# Patient Record
Sex: FEMALE | Race: WHITE | Employment: FULL TIME | URBAN - METROPOLITAN AREA
[De-identification: names, ages, dates, MRNs, and addresses within clinical notes are randomized per-mention and may not be internally consistent; named-entity substitution may affect disease eponyms.]

---

## 2017-02-15 DIAGNOSIS — F90.0 ATTENTION DEFICIT HYPERACTIVITY DISORDER (ADHD), PREDOMINANTLY INATTENTIVE TYPE: ICD-10-CM

## 2017-02-15 RX ORDER — LISDEXAMFETAMINE DIMESYLATE 30 MG/1
30 CAPSULE ORAL EVERY MORNING
Qty: 90 CAPSULE | Refills: 0 | Status: SHIPPED | OUTPATIENT
Start: 2017-02-15 | End: 2017-05-22

## 2017-05-22 ENCOUNTER — TELEPHONE (OUTPATIENT)
Dept: FAMILY MEDICINE | Facility: CLINIC | Age: 47
End: 2017-05-22

## 2017-05-22 DIAGNOSIS — F90.0 ATTENTION DEFICIT HYPERACTIVITY DISORDER (ADHD), PREDOMINANTLY INATTENTIVE TYPE: ICD-10-CM

## 2017-05-22 RX ORDER — LISDEXAMFETAMINE DIMESYLATE 30 MG/1
30 CAPSULE ORAL EVERY MORNING
Qty: 90 CAPSULE | Refills: 0 | Status: SHIPPED | OUTPATIENT
Start: 2017-05-22 | End: 2017-09-11

## 2017-05-22 NOTE — TELEPHONE ENCOUNTER
Last filled 2/15/17 for 90 day supply, Danna pt at [facility] [place] [place].  Pt on time for refill.  Has appt for annual set up for June 9th with Danna. Of note, sending along PA info to PA dept as pt knows needs to be re-done.     Katey RomoRN  May 22, 2017 4:10 PM

## 2017-05-30 NOTE — TELEPHONE ENCOUNTER
Called pt and relayed PA approval.  PA team stated pt needs to send script to [facility] if this is whom she wants to fill and deliver med.  Pt should also contact the mail order to confirm all.   Can see below they ran the PA through [facility] her insurance, even though states they notified [facility] [address] of the approval.  PA team feels this should not be a problem, as [facility] is insurance that approved.     Katey Romo RN  May 30, 2017 4:20 PM

## 2017-05-30 NOTE — TELEPHONE ENCOUNTER
Prior Authorization Approval    Authorization Effective Date: 5/30/2017  Authorization Expiration Date: 5/30/2018  Medication: lisdexamfetamine (VYVANSE) 30 MG-APPROVED  Approved Dose/Quantity:    Reference #: [id number]   Insurance Company: oSn ([facility]) - Phone [phone] Fax [phone]  Expected CoPay: UNKNOWN     CoPay Card Available:      Foundation Assistance Needed:    Which Pharmacy is filling the prescription (Not needed for infusion/clinic administered): [id number] [place], [place] - [address]  Pharmacy Notified: Yes  Patient Notified: Yes

## 2017-05-30 NOTE — TELEPHONE ENCOUNTER
[facility] Prior Authorization Team   Phone: [phone]  Fax: [phone]    PA Initiation    Medication: lisdexamfetamine (VYVANSE) 30 MG capsule -   Insurance Company: [employer] ([facility]) - Phone [phone] Fax [phone]  [id number] [place], [place] - [address]  Filling Pharmacy Phone: [phone]  Filling Pharmacy Fax: [phone]  Start Date: 5/23/2017

## 2017-06-09 ENCOUNTER — OFFICE VISIT (OUTPATIENT)
Dept: FAMILY MEDICINE | Facility: CLINIC | Age: 47
End: 2017-06-09

## 2017-06-09 VITALS
RESPIRATION RATE: 15 BRPM | BODY MASS INDEX: 23.48 KG/M2 | DIASTOLIC BLOOD PRESSURE: 75 MMHG | SYSTOLIC BLOOD PRESSURE: 109 MMHG | HEART RATE: 74 BPM | OXYGEN SATURATION: 98 % | WEIGHT: 116.5 LBS | HEIGHT: 59 IN | TEMPERATURE: 97.6 F

## 2017-06-09 DIAGNOSIS — Z13.29 SCREENING FOR THYROID DISORDER: ICD-10-CM

## 2017-06-09 DIAGNOSIS — Z00.00 ANNUAL PHYSICAL EXAM: Primary | ICD-10-CM

## 2017-06-09 DIAGNOSIS — Z13.1 SCREENING FOR DIABETES MELLITUS: ICD-10-CM

## 2017-06-09 DIAGNOSIS — Z13.220 SCREENING FOR CHOLESTEROL LEVEL: ICD-10-CM

## 2017-06-09 LAB
ANION GAP SERPL CALCULATED.3IONS-SCNC: 7 MMOL/L (ref 3–14)
BUN SERPL-MCNC: 11 MG/DL (ref 7–30)
CALCIUM SERPL-MCNC: 9.1 MG/DL (ref 8.5–10.1)
CHLORIDE SERPL-SCNC: 105 MMOL/L (ref 94–109)
CHOLEST SERPL-MCNC: 217 MG/DL
CO2 SERPL-SCNC: 28 MMOL/L (ref 20–32)
CREAT SERPL-MCNC: 0.71 MG/DL (ref 0.52–1.04)
GFR SERPL CREATININE-BSD FRML MDRD: 89 ML/MIN/1.7M2
GLUCOSE SERPL-MCNC: 79 MG/DL (ref 70–99)
HDLC SERPL-MCNC: 88 MG/DL
LDLC SERPL CALC-MCNC: 119 MG/DL
NONHDLC SERPL-MCNC: 129 MG/DL
POTASSIUM SERPL-SCNC: 3.9 MMOL/L (ref 3.4–5.3)
SODIUM SERPL-SCNC: 141 MMOL/L (ref 133–144)
TRIGL SERPL-MCNC: 47 MG/DL
TSH SERPL DL<=0.05 MIU/L-ACNC: 3.1 MU/L (ref 0.4–4)

## 2017-06-09 NOTE — PROGRESS NOTES
Nika is a 47 year old female presents to [facility] for annual Exam:  Concerns:     Neck/back pain and tingling. Seeing a structural harmony practitioner, radha and helping after three sessions.      ADHD: Vyvanse 30 mg daily - concentration/focus and fatigue. 90 Days RX through mail order and letter yearly.   HCM: Mammogram/Tomosynthesis 5/2016; Pap/HPV 7.15.2016; immunizations UTD.   ROS:  General: none  Head/Eyes: neck movement has improved.   Ears/Nose/Throat: none  Cardiovascular: none  Respiratory: none  Gastrointestinal:good  Breast: none  Genitourinary: none  Sexual Function: none  Musculoskeletal:numbness and tingling in finger   Skin: none  Neurological: none  Mental Health: memory loss  Endocrine: none  PAST OB/GYN HISTORY:  G0. Not using contraception [no desire] with regular cycles. [what ever happens/happens]  HCM: PAP/Pelvic august 2016 [has gyn] and mammogram in 5/2017 at [facility].    Past Medical History:   Diagnosis Date     ADHD (attention deficit hyperactivity disorder)     Vyvanse 30 mg     Allergic rhinitis, cause unspecified 8/27/12     Cervical dysplasia 2000     Dysmenorrhea      Raynaud's disease      TMJ (dislocation of temporomandibular joint)      Vitamin D insufficiency 5/2/12   Life Style Modifiers:   Tobacco:  reports that she has never smoked. She does not have any smokeless tobacco history on file.  Alcohol:  reports that  drinks alcohol.   Drug use:  reports that she does not use illicit drugs.  Exercise: Has a  - 6 days a week with a                     Diet: clean and good. No restriction   Supplementation: MTV.   PAST SURGICAL HISTORY:  Past Surgical History   Procedure Laterality Date     Mandible surgery  2006     Tonsillectomy       Adenoidectomy     FAMILY HISTORY:  Mother with breast cancer age 52.    SOCIAL HISTORY:  Purchased condo at [employer] - completed fall 2018.   Moved from to [facility] in 2000.  2014. [place].  with colon cancer - doing well.  " She works at [employer], to - skin products.   MEDICATIONS:  Current Outpatient Prescriptions   Medication     lisdexamfetamine (VYVANSE) 30 MG capsule     naproxen sodium (ANAPROX) 550 MG tablet   ALLERGIES:  Review of patient's allergies indicates no known allergies.  VITALS:  /75  Pulse 74  Temp 97.6  F (36.4  C) (Oral)  Resp 15  Ht 4' 11.45\" (151 cm)  Wt 116 lb 8 oz (52.8 kg)  LMP 06/07/2017 (Exact Date)  SpO2 98%  BMI 23.18 kg/m2  PHYSICAL EXAM:  Constitutional: Well appearing woman in no acute distress.   Psychological: appropriate mood - anxious.  Eyes: anicteric, normal extra-ocular movements   Neck: No thyroidmegaly.   Cardiovascular: regular rate and rhythm, normal S1 and S2, no murmurs, rubs or gallops, peripheral pulses full and symmetric   Respiratory: clear to auscultation, no wheezes or crackles, normal breath sounds.  Breast: N/A .   Musculoskeletal: full range of motion    Skin: Multiple small moles   Neurological: normal gait, no tremor.   GYN: N/A [ has her own GYN]   Diagnoses and associated orders for this visit:  Annual Exam:    -  PAP/Pelvic with GYN office  -   Immunizations up to date  -   Mammogram up to date  -   Estrofactor when perimenopausal  -   Vitamin D 1000 - 2000 mg daily  -   Send letter with lab results.   Vitamin D deficiency  -  Vitamin D on a daily basis  Attention deficit disorder with hyperactivity  - RX - Lisdexamfetamine (VYVANSE) 30 MG capsule; Take 1 capsule (30 mg) by mouth every morning.  Will fill [facility].  And she will call for #90 again in September to send it in the mail. She has documented ADD with good response to Vyvanse for the past years.    Screening for thyroid disorder  -     TSH  Screening for cholesterol level  -     Lipid Profile  Screening for diabetes mellitus  -     Basic metabolic panel          "

## 2017-06-09 NOTE — NURSING NOTE
"47 year old  Chief Complaint   Patient presents with     Physical       Blood pressure 109/75, pulse 74, temperature 97.6  F (36.4  C), temperature source Oral, resp. rate 15, height 4' 11.45\" (151 cm), weight 116 lb 8 oz (52.8 kg), last menstrual period 06/07/2017, SpO2 98 %, not currently breastfeeding. Body mass index is 23.18 kg/(m^2).  Patient Active Problem List   Diagnosis     Breast lump in upper outer quadrant     Attention deficit hyperactivity disorder (ADHD)     Numbness and tingling       Wt Readings from Last 2 Encounters:   06/09/17 116 lb 8 oz (52.8 kg)   08/30/16 113 lb 11.2 oz (51.6 kg)     BP Readings from Last 3 Encounters:   06/09/17 109/75   08/30/16 93/62   05/29/15 119/84         Current Outpatient Prescriptions   Medication     lisdexamfetamine (VYVANSE) 30 MG capsule     naproxen sodium (ANAPROX) 550 MG tablet     No current facility-administered medications for this visit.        Social History   Substance Use Topics     Smoking status: Never Smoker     Smokeless tobacco: Not on file     Alcohol use Yes       Health Maintenance Due   Topic Date Due     LIPID SCREEN Q5 YR FEMALE (SYSTEM ASSIGNED)  02/12/2017     MAMMO Q1 YR  05/09/2017       No results found for: PAP      June 9, 2017 10:52 AM    "

## 2017-06-09 NOTE — MR AVS SNAPSHOT
After Visit Summary   6/9/2017    Nika Foreman    MRN: [id number]           Patient Information     Date Of Birth          1970        Visit Information        Provider Department      6/9/2017 10:40 AM Elham Clay MD [facility]        Today's Diagnoses     Annual physical exam    -  1    Screening for thyroid disorder        Screening for cholesterol level        Screening for diabetes mellitus           Follow-ups after your visit        Who to contact     Please call your clinic at [phone] to:    Ask questions about your health    Make or cancel appointments    Discuss your medicines    Learn about your test results    Speak to your doctor   If you have compliments or concerns about an experience at your clinic, or if you wish to file a complaint, please contact [facility] Patient Relations at [phone] or email us at [email or web address]         Additional Information About Your Visit        MyChart Information     [employer] gives you secure access to your electronic health record. If you see a primary care provider, you can also send messages to your care team and make appointments. If you have questions, please call your primary care clinic.  If you do not have a primary care provider, please call [phone] and they will assist you.      [employer] Software is an electronic gateway that provides easy, online access to your medical records. With Secure Software, you can request a clinic appointment, read your test results, renew a prescription or communicate with your care team.     To access your existing account, please contact your [facility] or call [phone] for assistance.        Care EveryWhere ID     This is your Care EveryWhere ID. This could be used by other organizations to access your [place] medical records  [id number]        Your Vitals Were     Pulse Temperature Respirations Height Last  "Period Pulse Oximetry    74 97.6  F (36.4  C) (Oral) 15 4' 11.45\" (151 cm) 06/07/2017 (Exact Date) 98%    BMI (Body Mass Index)                   23.18 kg/m2            Blood Pressure from Last 3 Encounters:   06/09/17 109/75   08/30/16 93/62   05/29/15 119/84    Weight from Last 3 Encounters:   06/09/17 116 lb 8 oz (52.8 kg)   08/30/16 113 lb 11.2 oz (51.6 kg)   07/22/16 113 lb (51.3 kg)              We Performed the Following     Basic metabolic panel     Lipid Profile     TSH        Primary Care Provider Office Phone # Fax #    Elham Clay [phone]       [facility] [address] [address]        Thank you!     Thank you for choosing [facility]  for your care. Our goal is always to provide you with excellent care. Hearing back from our patients is one way we can continue to improve our services. Please take a few minutes to complete the written survey that you may receive in the mail after your visit with us. Thank you!             Your Updated Medication List - Protect others around you: Learn how to safely use, store and throw away your medicines at www.disposemymeds.org.          This list is accurate as of: 6/9/17 11:36 AM.  Always use your most recent med list.                   Brand Name Dispense Instructions for use    lisdexamfetamine 30 MG capsule    VYVANSE    90 capsule    Take 1 capsule (30 mg) by mouth every morning       naproxen sodium 550 MG tablet    ANAPROX     Take by mouth 2 times daily (with meals)         "

## 2017-07-07 DIAGNOSIS — E55.9 VITAMIN D DEFICIENCY: Primary | ICD-10-CM

## 2017-07-15 ENCOUNTER — HEALTH MAINTENANCE LETTER (OUTPATIENT)
Age: 47
End: 2017-07-15

## 2017-09-11 DIAGNOSIS — F90.0 ATTENTION DEFICIT HYPERACTIVITY DISORDER (ADHD), PREDOMINANTLY INATTENTIVE TYPE: ICD-10-CM

## 2017-09-11 RX ORDER — LISDEXAMFETAMINE DIMESYLATE 30 MG/1
30 CAPSULE ORAL EVERY MORNING
Qty: 90 CAPSULE | Refills: 0 | Status: SHIPPED | OUTPATIENT
Start: 2017-09-11 | End: 2017-12-12

## 2017-12-12 DIAGNOSIS — F90.0 ATTENTION DEFICIT HYPERACTIVITY DISORDER (ADHD), PREDOMINANTLY INATTENTIVE TYPE: ICD-10-CM

## 2017-12-12 RX ORDER — LISDEXAMFETAMINE DIMESYLATE 30 MG/1
30 CAPSULE ORAL EVERY MORNING
Qty: 90 CAPSULE | Refills: 0 | Status: SHIPPED | OUTPATIENT
Start: 2017-12-12 | End: 2017-12-15

## 2017-12-12 NOTE — TELEPHONE ENCOUNTER
Pt called to get refill of Vyvanse. Pt would like a call back to discuss if it can be mailed or if she needs to come to the clinic to pick it up. Please call back pt, she runs out of medication on Friday. Thanks              Last office visit: 06/09/2017, No future appointment

## 2017-12-15 ENCOUNTER — TELEPHONE (OUTPATIENT)
Dept: FAMILY MEDICINE | Facility: CLINIC | Age: 47
End: 2017-12-15

## 2017-12-15 DIAGNOSIS — F90.0 ATTENTION DEFICIT HYPERACTIVITY DISORDER (ADHD), PREDOMINANTLY INATTENTIVE TYPE: ICD-10-CM

## 2017-12-15 RX ORDER — LISDEXAMFETAMINE DIMESYLATE 30 MG/1
30 CAPSULE ORAL EVERY MORNING
Qty: 90 CAPSULE | Refills: 0 | Status: SHIPPED | OUTPATIENT
Start: 2017-12-15 | End: 2018-03-23

## 2017-12-15 NOTE — TELEPHONE ENCOUNTER
----- Message from Dante Stewart sent at 12/15/2017 12:12 PM CST -----  Regarding: REFILL NEEDED TODAY - Dr. Clay Pt  Contact: [phone]  Pt stated that she would like a refill of her VYVANSE.  Pt stated she was expecting it ready for  today.  Please call this Pt back at the number above.     Thank you,  CV  Call Center

## 2017-12-15 NOTE — TELEPHONE ENCOUNTER
Prescription for the Vyvanse is ready at the  to .  I have called and left her a message.   Approved per Dr. Clay.

## 2018-03-20 ENCOUNTER — TELEPHONE (OUTPATIENT)
Dept: FAMILY MEDICINE | Facility: CLINIC | Age: 48
End: 2018-03-20

## 2018-03-21 NOTE — TELEPHONE ENCOUNTER
PA Initiation    Medication: lisdexamfetamine (VYVANSE) 30 MG capsule-Initiated  Insurance Company: [employer] [place] - Phone [phone] Fax [phone]  [id number] [place], [place] - [address]  Filling Pharmacy Phone: [phone]  Filling Pharmacy Fax:    Start Date: 3/21/2018    Received forms to fill out and fax back to insurance.  Filled out clinical information on forms and manually faxed back to [facility] at [phone]

## 2018-03-21 NOTE — TELEPHONE ENCOUNTER
Prior Authorization Approval    Authorization Effective Date: 3/21/2018  Authorization Expiration Date: 12/31/1999  Medication: lisdexamfetamine (VYVANSE) 30 MG capsule-APPROVED  Approved Dose/Quantity:   Reference #:     Insurance Company: [employer] [place] - Phone [phone] Fax [phone]  Expected CoPay: $     CoPay Card Available:      Foundation Assistance Needed:    Which Pharmacy is filling the prescription (Not needed for infusion/clinic administered):   Pharmacy Notified: No  Patient Notified: Yes

## 2018-03-23 DIAGNOSIS — F90.0 ATTENTION DEFICIT HYPERACTIVITY DISORDER (ADHD), PREDOMINANTLY INATTENTIVE TYPE: ICD-10-CM

## 2018-03-23 RX ORDER — LISDEXAMFETAMINE DIMESYLATE 30 MG/1
30 CAPSULE ORAL EVERY MORNING
Qty: 90 CAPSULE | Refills: 0 | Status: SHIPPED | OUTPATIENT
Start: 2018-03-23 | End: 2018-04-13

## 2018-04-12 NOTE — PROGRESS NOTES
Nika is a 48 year old female presents to [facility] for annual Exam:    Concerns:   Perimenopausal symptoms: menses are painful every three-four weeks - cramps have worsened in the last couple of months. Lots of craving sweet craving the weeks before menses, coldness of finger tips and toes, fatigue and weight gain. Wondering about testing for menopause. No hot flashes or night sweats.     Sees radha for her back pain     Goes to GYN for pap/Pelvic and mammogram in May 2018  ADHD: Vyvanse 30 mg daily - concentration/focus and fatigue well managed.   HCM: Mammogram/Tomosynthesis 5/2016; Pap/HPV 7.15.2016; immunizations UTD.   ROS:  General: none  Head/Eyes: neck movement has improved.   Ears/Nose/Throat: none  Cardiovascular: none  Respiratory: none  Gastrointestinal:good  Breast: none  Genitourinary: none  Sexual Function: none  Musculoskeletal:numbness and tingling in finger   Skin: none  Neurological: none  Mental Health: memory loss  Endocrine: none  PAST OB/GYN HISTORY:  G0. Not using contraception [no desire] with regular cycles. [what ever happens/happens]  HCM: PAP/Pelvic august 2016 [has gyn] and mammogram in 5/2017 at [facility].    Past Medical History:   Diagnosis Date     ADHD (attention deficit hyperactivity disorder)     Vyvanse 30 mg     Allergic rhinitis, cause unspecified 8/27/12     Cervical dysplasia 2000     Dysmenorrhea      Raynaud's disease      TMJ (dislocation of temporomandibular joint)      Vitamin D insufficiency 5/2/12   Life Style Modifiers:   Tobacco:  reports that she has never smoked. She does not have any smokeless tobacco history on file.  Alcohol:  reports that  drinks alcohol.   Drug use:  reports that she does not use illicit drugs.  Exercise: Has a  - 6 days a week with a                     Diet: clean and good. No restriction   Supplementation: MTV.   PAST SURGICAL HISTORY:  Past Surgical History   Procedure Laterality Date     Mandible surgery  2006     Tonsillectomy        Adenoidectomy     FAMILY HISTORY:  Mother with breast cancer age 52.    SOCIAL HISTORY:  Purchased condo at [employer] - completed fall 2018.   Moved from to [place] in 2000.  2014. [place].  with colon cancer - doing well.  She works at [employer], to - skin products.   MEDICATIONS:  Current Outpatient Prescriptions   Medication     lisdexamfetamine (VYVANSE) 30 MG capsule     naproxen sodium (ANAPROX) 550 MG tablet   ALLERGIES:  Review of patient's allergies indicates no known allergies.  VITALS:  There were no vitals taken for this visit.  PHYSICAL EXAM:  Constitutional: Well appearing woman in no acute distress.   Psychological: appropriate mood - anxious.  Eyes: anicteric, normal extra-ocular movements   Neck: No thyroidmegaly.   Cardiovascular: regular rate and rhythm, normal S1 and S2, no murmurs, rubs or gallops, peripheral pulses full and symmetric   Respiratory: clear to auscultation, no wheezes or crackles, normal breath sounds.  Breast: N/A .   Musculoskeletal: full range of motion    Skin: Multiple small moles   Neurological: normal gait, no tremor.   GYN: N/A [ has her own GYN]   Diagnoses and associated orders for this visit:  Annual Exam:    -  PAP/Pelvic with GYN office  -   Immunizations up to date  -   Mammogram up to date  -   Vitamin D 1000 - 2000 mg daily  Vitamin D deficiency  -  Vitamin D 2000 iu daily  -  Vitamin D level.  Attention deficit disorder with hyperactivity  - RX - Lisdexamfetamine (VYVANSE) 30 MG capsule; Take 1 capsule (30 mg) by mouth every morning.  Will fill from [facility] # 90.  She has documented ADD with good response to Vyvanse for the past years.    Perimenopausal:  -  Estrofactor discussed and she may consider for perimenopausal symptoms   - exercising and eating well   - Acupuncture - Reiki/clari [Blas Kahn]   - Declined OCP's [not tolerated] or IUD mirena, Ibuprofen/Naprosyn 24 hours before onset of menses [she will consider]    - Pelvic US could be  discussed when she has her GYN visit

## 2018-04-13 ENCOUNTER — OFFICE VISIT (OUTPATIENT)
Dept: FAMILY MEDICINE | Facility: CLINIC | Age: 48
End: 2018-04-13
Payer: COMMERCIAL

## 2018-04-13 VITALS
SYSTOLIC BLOOD PRESSURE: 109 MMHG | TEMPERATURE: 98.3 F | OXYGEN SATURATION: 99 % | WEIGHT: 120.75 LBS | DIASTOLIC BLOOD PRESSURE: 73 MMHG | BODY MASS INDEX: 23.71 KG/M2 | HEIGHT: 60 IN | HEART RATE: 73 BPM

## 2018-04-13 DIAGNOSIS — F90.0 ATTENTION DEFICIT HYPERACTIVITY DISORDER (ADHD), PREDOMINANTLY INATTENTIVE TYPE: ICD-10-CM

## 2018-04-13 DIAGNOSIS — N95.1 PERIMENOPAUSAL: ICD-10-CM

## 2018-04-13 DIAGNOSIS — E55.9 VITAMIN D DEFICIENCY: ICD-10-CM

## 2018-04-13 DIAGNOSIS — N94.6 DYSMENORRHEA: ICD-10-CM

## 2018-04-13 DIAGNOSIS — Z00.00 ANNUAL PHYSICAL EXAM: Primary | ICD-10-CM

## 2018-04-13 LAB
T3FREE SERPL-MCNC: 2.3 PG/ML (ref 2.3–4.2)
T4 FREE SERPL-MCNC: 0.92 NG/DL (ref 0.76–1.46)
TSH SERPL DL<=0.005 MIU/L-ACNC: 2.74 MU/L (ref 0.4–4)

## 2018-04-13 RX ORDER — LISDEXAMFETAMINE DIMESYLATE 30 MG/1
30 CAPSULE ORAL EVERY MORNING
Qty: 90 CAPSULE | Refills: 0 | Status: SHIPPED | OUTPATIENT
Start: 2018-04-13 | End: 2018-06-29

## 2018-04-13 RX ORDER — NAPROXEN SODIUM 550 MG/1
550 TABLET ORAL 2 TIMES DAILY WITH MEALS
Qty: 60 TABLET | Refills: 1 | Status: SHIPPED | OUTPATIENT
Start: 2018-04-13 | End: 2019-05-08

## 2018-04-13 ASSESSMENT — PAIN SCALES - GENERAL: PAINLEVEL: NO PAIN (0)

## 2018-04-13 NOTE — NURSING NOTE
"48 year old  Chief Complaint   Patient presents with     Physical     Wants to discuss menopause symptoms.       Blood pressure 109/73, pulse 73, temperature 98.3  F (36.8  C), temperature source Temporal, height 4' 11.61\" (151.4 cm), weight 120 lb 12 oz (54.8 kg), last menstrual period 03/23/2018, SpO2 99 %, not currently breastfeeding. Body mass index is 23.9 kg/(m^2).  Patient Active Problem List   Diagnosis     Breast lump in upper outer quadrant     Attention deficit hyperactivity disorder (ADHD)     Numbness and tingling       Wt Readings from Last 2 Encounters:   04/13/18 120 lb 12 oz (54.8 kg)   06/09/17 116 lb 8 oz (52.8 kg)     BP Readings from Last 3 Encounters:   04/13/18 109/73   06/09/17 109/75   08/30/16 93/62         Current Outpatient Prescriptions   Medication     lisdexamfetamine (VYVANSE) 30 MG capsule     naproxen sodium (ANAPROX) 550 MG tablet     No current facility-administered medications for this visit.        Social History   Substance Use Topics     Smoking status: Never Smoker     Smokeless tobacco: Never Used     Alcohol use Yes       Health Maintenance Due   Topic Date Due     MAMMO Q1 YR  05/09/2017       No results found for: ILIR Romeo MA  April 13, 2018 10:56 AM    "

## 2018-04-13 NOTE — PATIENT INSTRUCTIONS
Perimenopausal and dysmenorrhea    Rolfing vs acupuncture    Naprosyn with cramps    Pelvic US - consider    Vitamin D 5 IU when remembering    Estrofactors:  Order products from [employer]  Call: [phone]  Code: Danna [id number]  On Line: www.[email or web address]  Estrofactors

## 2018-04-13 NOTE — MR AVS SNAPSHOT
After Visit Summary   4/13/2018    Nika Foreman    MRN: [id number]           Patient Information     Date Of Birth          1970        Visit Information        Provider Department      4/13/2018 10:40 AM Elham Clay MD [facility]        Today's Diagnoses     Annual physical exam    -  1    Dysmenorrhea        Perimenopausal        Attention deficit hyperactivity disorder (ADHD), predominantly inattentive type        Vitamin D deficiency          Care Instructions    Perimenopausal and dysmenorrhea    Rolfing vs acupuncture    Naprosyn with cramps    Pelvic US - consider    Vitamin D 5 IU when remembering    Estrofactors:  Order products from [employer]  Call: [phone]  Code: Danna [id number]  On Line: www.[email or web address]  Estrofactors          Follow-ups after your visit        Who to contact     Please call your clinic at [phone] to:    Ask questions about your health    Make or cancel appointments    Discuss your medicines    Learn about your test results    Speak to your doctor            Additional Information About Your Visit        SensGardharShoppable Information     [employer] gives you secure access to your electronic health record. If you see a primary care provider, you can also send messages to your care team and make appointments. If you have questions, please call your primary care clinic.  If you do not have a primary care provider, please call [phone] and they will assist you.      [employer] is an electronic gateway that provides easy, online access to your medical records. With [employer], you can request a clinic appointment, read your test results, renew a prescription or communicate with your care team.     To access your existing account, please contact your [facility] or call [phone] for assistance.        Care EveryWhere ID     This is your Care EveryWhere ID. This could be used by other organizations to access your [place]  "medical records  [id number]        Your Vitals Were     Pulse Temperature Height Last Period Pulse Oximetry BMI (Body Mass Index)    73 98.3  F (36.8  C) (Temporal) 4' 11.61\" (151.4 cm) 03/23/2018 (Exact Date) 99% 23.9 kg/m2       Blood Pressure from Last 3 Encounters:   04/13/18 109/73   06/09/17 109/75   08/30/16 93/62    Weight from Last 3 Encounters:   04/13/18 120 lb 12 oz (54.8 kg)   06/09/17 116 lb 8 oz (52.8 kg)   08/30/16 113 lb 11.2 oz (51.6 kg)              We Performed the Following     T3 Free     T4 free     TSH     Vitamin D Deficiency          Today's Medication Changes          These changes are accurate as of 4/13/18 11:26 AM.  If you have any questions, ask your nurse or doctor.               These medicines have changed or have updated prescriptions.        Dose/Directions    naproxen sodium 550 MG tablet   Commonly known as:  ANAPROX   This may have changed:  how much to take   Used for:  Dysmenorrhea   Changed by:  Elham Clay MD        Dose:  550 mg   Take 1 tablet (550 mg) by mouth 2 times daily (with meals)   Quantity:  60 tablet   Refills:  1            Where to get your medicines      These medications were sent to [facility] [id number] [facility] - [place], [place] - [address]  [address], [place] [address]     Phone:  [phone]     naproxen sodium 550 MG tablet         Some of these will need a paper prescription and others can be bought over the counter.  Ask your nurse if you have questions.     Bring a paper prescription for each of these medications     lisdexamfetamine 30 MG capsule                Primary Care Provider Office Phone # Fax #    Elham Clay [phone]       [address] [facility] [address]  [facility] [address]        Equal Access to Services     ROGER ESQUIVEL AH: Tani Mckeon, waaxda luqadaha, qaybta kaalmada renée, jay garcia. [facility] [phone].    ATENCIÓN: Si sara clarke a good " disposición servicios gratuitos de asistencia lingüística. [place] [phone].    We comply with applicable federal civil rights laws and [place] laws. We do not discriminate on the basis of race, color, national origin, age, disability, sex, sexual orientation, or gender identity.            Thank you!     [facility] you for choosing [facility]. Our goal is always to provide you with excellent care. Hearing back from our patients is one way we can continue to improve our services. Please take a few minutes to complete the written survey that you may receive in the mail after your visit with us. Thank you!             Your Updated Medication List - Protect others around you: Learn how to safely use, store and throw away your medicines at www.disposemymeds.org.          This list is accurate as of 4/13/18 11:26 AM.  Always use your most recent med list.                   Brand Name Dispense Instructions for use Diagnosis    lisdexamfetamine 30 MG capsule    VYVANSE    90 capsule    Take 1 capsule (30 mg) by mouth every morning    Attention deficit hyperactivity disorder (ADHD), predominantly inattentive type       naproxen sodium 550 MG tablet    ANAPROX    60 tablet    Take 1 tablet (550 mg) by mouth 2 times daily (with meals)    Dysmenorrhea

## 2018-04-16 LAB — DEPRECATED CALCIDIOL+CALCIFEROL SERPL-MC: 25 UG/L (ref 20–75)

## 2018-06-29 ENCOUNTER — TELEPHONE (OUTPATIENT)
Dept: FAMILY MEDICINE | Facility: CLINIC | Age: 48
End: 2018-06-29

## 2018-06-29 DIAGNOSIS — F90.0 ATTENTION DEFICIT HYPERACTIVITY DISORDER (ADHD), PREDOMINANTLY INATTENTIVE TYPE: ICD-10-CM

## 2018-06-29 RX ORDER — LISDEXAMFETAMINE DIMESYLATE 30 MG/1
30 CAPSULE ORAL EVERY MORNING
Qty: 90 CAPSULE | Refills: 0 | Status: SHIPPED | OUTPATIENT
Start: 2018-06-29 | End: 2018-10-19

## 2018-06-29 RX ORDER — LISDEXAMFETAMINE DIMESYLATE 30 MG/1
30 CAPSULE ORAL EVERY MORNING
Qty: 90 CAPSULE | Refills: 0 | Status: SHIPPED | OUTPATIENT
Start: 2018-06-29 | End: 2018-06-29

## 2018-06-29 NOTE — TELEPHONE ENCOUNTER
----- Message from Svetlana Durand sent at 6/29/2018  1:40 PM CDT -----  Regarding: RX Refill - Dr Clay  Contact: [phone]  The pt is calling to request a 90 day supply for lisdexamfetamine (VYVANSE) 30 MG capsule. She is hoping to be able to  the script today as they going [place].   Please call the pt at [phone] when it is ready to .  Thanks - Svetlana    Please DO NOT send this message and/or reply back to sender.  Call Center Representatives DO NOT respond to messages.        Called patient - Dr Clay will have signed script ready by 4pm.  Patient will come in to collect.    Luann Morgan RN

## 2018-07-22 ENCOUNTER — HEALTH MAINTENANCE LETTER (OUTPATIENT)
Age: 48
End: 2018-07-22

## 2018-10-19 DIAGNOSIS — F90.0 ATTENTION DEFICIT HYPERACTIVITY DISORDER (ADHD), PREDOMINANTLY INATTENTIVE TYPE: ICD-10-CM

## 2018-10-19 RX ORDER — LISDEXAMFETAMINE DIMESYLATE 30 MG/1
30 CAPSULE ORAL EVERY MORNING
Qty: 90 CAPSULE | Refills: 0 | Status: SHIPPED | OUTPATIENT
Start: 2018-10-19 | End: 2019-01-18

## 2019-01-17 DIAGNOSIS — F90.0 ATTENTION DEFICIT HYPERACTIVITY DISORDER (ADHD), PREDOMINANTLY INATTENTIVE TYPE: ICD-10-CM

## 2019-01-17 NOTE — TELEPHONE ENCOUNTER
Last Office Visit: 4/13/18  Future [facility] Appointments: none  Medication last refilled: 10/19/18 #90 with 0 RF    Routing refill request to provider for review/approval because: Drug not on the [facility] refill protocol - to provider to daisy Reynoso RN  01/17/19  2:30 PM

## 2019-01-18 RX ORDER — LISDEXAMFETAMINE DIMESYLATE 30 MG/1
30 CAPSULE ORAL EVERY MORNING
Qty: 90 CAPSULE | Refills: 0 | Status: SHIPPED | OUTPATIENT
Start: 2019-01-18 | End: 2019-05-02

## 2019-05-01 DIAGNOSIS — F90.0 ATTENTION DEFICIT HYPERACTIVITY DISORDER (ADHD), PREDOMINANTLY INATTENTIVE TYPE: ICD-10-CM

## 2019-05-01 NOTE — TELEPHONE ENCOUNTER
[facility] Call Center    Phone Message    May a detailed message be left on voicemail: yes    Reason for Call: Medication Refill Request    Has the patient contacted the pharmacy for the refill? Yes   Name of medication being requested: lisdexamfetamine (VYVANSE) 30 MG capsule  Provider who prescribed the medication: Danna  Pharmacy: patient will  at   Date medication is needed: patient would like a call when it is available for , she did state that she will be out by Monday and would like to  on Friday if she can.         Action Taken: Message routed to:  [facility]:

## 2019-05-01 NOTE — TELEPHONE ENCOUNTER
Last Office Visit: 4/13/18  Future [facility] Appointments: 5/8/19  Medication last refilled: 1/17/19 #90 with 0 RF    Routing refill request to provider for review/approval because: Drug not on the [facility] refill protocol   Dina Reynoso RN  05/01/19  3:35 PM

## 2019-05-02 ENCOUNTER — TELEPHONE (OUTPATIENT)
Dept: OBGYN | Facility: CLINIC | Age: 49
End: 2019-05-02

## 2019-05-02 RX ORDER — LISDEXAMFETAMINE DIMESYLATE 30 MG/1
30 CAPSULE ORAL EVERY MORNING
Qty: 90 CAPSULE | Refills: 0 | Status: SHIPPED | OUTPATIENT
Start: 2019-05-02 | End: 2019-05-08

## 2019-05-02 NOTE — TELEPHONE ENCOUNTER
ARACELIS Arroyo called from [facility] requesting Vyvance order be signed in our clinic so patient can  today.    Retrieved hard copy signed by Dr. Clay and left at  for patient to  today

## 2019-05-02 NOTE — TELEPHONE ENCOUNTER
Called and informed the patient via voicemail that the Rx is signed and ready to  at [facility] - provided the address and let her know that it is about a mile if she were to walk.   Dina Reynoso RN  05/02/19  10:04 AM

## 2019-05-06 NOTE — PROGRESS NOTES
Nika is a 49 year old female presents to [facility] for annual Exam:    Concerns:   Wondering about colonoscopy and getting it earlier: unknown knowledge of family history of colon cancer.   with colon cancer    Perimenopausal symptoms: menses are painful every two weeks with cramping. Seeing Jacques Szymanski DO and cramping better this past month. [taking chaste berry and magnesium]  Goes to GYN for pap/Pelvic and mammogram [had hysterosonogram - 11/2018] and is scheduled for visit May 2019  ADHD: Vyvanse 30 mg daily - concentration/focus and fatigue well managed on this regimen.     HCM: Mammogram/Tomosynthesis 5/2018; Pap/HPV 7.15.2016; immunizations UTD;  ROS:  General: cold hands   Ears/Nose/Throat: none  Cardiovascular: none  Respiratory: none  Gastrointestinal:good  Breast: none  Genitourinary: none  Sexual Function: none  Musculoskeletal:numbness and tingling in finger   Skin: NAILS ARE BAD - WILL TAKE COLLAGEN  Neurological: none  Mental Health: memory loss  Endocrine: none  PAST OB/GYN HISTORY:  G0. Not using contraception [no desire] with regular cycles. [what ever happens/happens]    Past Medical History:   Diagnosis Date     ADHD (attention deficit hyperactivity disorder)     Vyvanse 30 mg     Allergic rhinitis, cause unspecified 8/27/12     Cervical dysplasia 2000     Dysmenorrhea      Raynaud's disease      TMJ (dislocation of temporomandibular joint)      Vitamin D insufficiency 5/2/12   Life Style Modifiers:   Tobacco:  reports that she has never smoked. She does not have any smokeless tobacco history on file.  Alcohol:  reports that  drinks alcohol.   Drug use:  reports that she does not use illicit drugs.  Exercise: Has a  - 6 days a week with a                     Diet: clean and good. No restriction   Supplementation: MTV.   PAST SURGICAL HISTORY:  Past Surgical History   Procedure Laterality Date     Mandible surgery  2006     Tonsillectomy       Adenoidectomy     FAMILY  "HISTORY:  Mother with breast cancer age 52.    SOCIAL HISTORY:  Purchased condo at [employer] - completed fall 2018.   Moved from to [facility] in 2000.  2014. [place].  with colon cancer - doing well.  She works at [employer], to - skin products.   MEDICATIONS:  Current Outpatient Prescriptions   Medication     lisdexamfetamine (VYVANSE) 30 MG capsule     naproxen sodium (ANAPROX) 550 MG tablet   ALLERGIES:  Review of patient's allergies indicates no known allergies.  VITALS:  /79 (BP Location: Right arm, Patient Position: Sitting, Cuff Size: Adult Regular)   Pulse 72   Temp 98  F (36.7  C) (Oral)   Resp 16   Ht 1.511 m (4' 11.5\")   Wt 53.8 kg (118 lb 8 oz)   LMP 04/28/2019 (Exact Date)   SpO2 100%   BMI 23.53 kg/m    PHYSICAL EXAM:  Constitutional: Well appearing woman in no acute distress.   Psychological: appropriate mood - anxious.  Eyes: anicteric, normal extra-ocular movements   Neck: No thyroidmegaly.   Cardiovascular: regular rate and rhythm, normal S1 and S2, no murmurs, rubs or gallops, peripheral pulses full and symmetric   Respiratory: clear to auscultation, no wheezes or crackles, normal breath sounds.  Breast: symmetrical without abnormal masses. No nipple changes.   Musculoskeletal: full range of motion    Skin: Multiple small moles   Neurological: normal gait, no tremor.   GYN: N/A [ has her own GYN]   Diagnoses and associated orders for this visit:  Annual Exam:   -   Immunizations up to date  -   Mammogram in May 2019   -   Vitamin D 1000 - 2000 mg daily  Attention deficit disorder with hyperactivity  - RX - Lisdexamfetamine (VYVANSE) 30 MG capsule; Take 1 capsule (30 mg) by mouth every morning.  Will fill from [facility].  She has documented ADD with good response to Vyvanse for the past years.  [posted 8.1.2019]  Perimenopausal:  -  Will continue to see Jacques Szymanski DO  -   GYN exam with bernabe yearly    - exercising and eating well                - [place]                - " Discussed a holistic approach  Colon cancer screening  -     GASTROENTEROLOGY ADULT REF PROCEDURE ONLY; Future    Dysmenorrhea  -     naproxen sodium (ANAPROX) 550 MG tablet; Take 1 tablet (550 mg) by mouth 2 times daily (with meals)

## 2019-05-08 ENCOUNTER — OFFICE VISIT (OUTPATIENT)
Dept: FAMILY MEDICINE | Facility: CLINIC | Age: 49
End: 2019-05-08
Payer: COMMERCIAL

## 2019-05-08 VITALS
BODY MASS INDEX: 23.26 KG/M2 | RESPIRATION RATE: 16 BRPM | HEIGHT: 60 IN | OXYGEN SATURATION: 100 % | WEIGHT: 118.5 LBS | DIASTOLIC BLOOD PRESSURE: 79 MMHG | TEMPERATURE: 98 F | SYSTOLIC BLOOD PRESSURE: 114 MMHG | HEART RATE: 72 BPM

## 2019-05-08 DIAGNOSIS — F90.0 ATTENTION DEFICIT HYPERACTIVITY DISORDER (ADHD), PREDOMINANTLY INATTENTIVE TYPE: ICD-10-CM

## 2019-05-08 DIAGNOSIS — Z12.11 COLON CANCER SCREENING: ICD-10-CM

## 2019-05-08 DIAGNOSIS — N94.6 DYSMENORRHEA: ICD-10-CM

## 2019-05-08 DIAGNOSIS — Z00.00 ANNUAL PHYSICAL EXAM: Primary | ICD-10-CM

## 2019-05-08 DIAGNOSIS — N95.1 PERIMENOPAUSAL: ICD-10-CM

## 2019-05-08 RX ORDER — LISDEXAMFETAMINE DIMESYLATE 30 MG/1
30 CAPSULE ORAL EVERY MORNING
Qty: 90 CAPSULE | Refills: 0 | Status: SHIPPED | OUTPATIENT
Start: 2019-08-01 | End: 2019-10-22

## 2019-05-08 RX ORDER — NAPROXEN SODIUM 550 MG/1
550 TABLET ORAL 2 TIMES DAILY WITH MEALS
Qty: 60 TABLET | Refills: 1 | Status: SHIPPED | OUTPATIENT
Start: 2019-05-08 | End: 2020-02-07

## 2019-05-08 ASSESSMENT — MIFFLIN-ST. JEOR: SCORE: 1086.07

## 2019-05-08 NOTE — PATIENT INSTRUCTIONS
[facility] [phone]       - neha     [facility] endoscopy [phone]    [facility]: [phone] endoscopy

## 2019-05-08 NOTE — NURSING NOTE
"49 year old  Chief Complaint   Patient presents with     Physical       Blood pressure 114/79, pulse 72, temperature 98  F (36.7  C), temperature source Oral, resp. rate 16, height 1.511 m (4' 11.5\"), weight 53.8 kg (118 lb 8 oz), last menstrual period 04/28/2019, SpO2 100 %, not currently breastfeeding. Body mass index is 23.53 kg/m .  Patient Active Problem List   Diagnosis     Breast lump in upper outer quadrant     Attention deficit hyperactivity disorder (ADHD)     Numbness and tingling       Wt Readings from Last 2 Encounters:   05/08/19 53.8 kg (118 lb 8 oz)   04/13/18 54.8 kg (120 lb 12 oz)     BP Readings from Last 3 Encounters:   05/08/19 114/79   04/13/18 109/73   06/09/17 109/75         Current Outpatient Medications   Medication     lisdexamfetamine (VYVANSE) 30 MG capsule     naproxen sodium (ANAPROX) 550 MG tablet     No current facility-administered medications for this visit.        Social History     Tobacco Use     Smoking status: Never Smoker     Smokeless tobacco: Never Used   Substance Use Topics     Alcohol use: Yes     Drug use: No       Health Maintenance Due   Topic Date Due     HIV SCREEN (SYSTEM ASSIGNED)  02/12/1990     MAMMO Q1 YR  05/09/2017     PHQ-2  01/01/2019     PREVENTIVE CARE VISIT  04/13/2019       No results found for: PAP      May 8, 2019 9:24 AM    "

## 2019-06-04 ENCOUNTER — TELEPHONE (OUTPATIENT)
Dept: GASTROENTEROLOGY | Facility: CLINIC | Age: 49
End: 2019-06-04

## 2019-06-04 NOTE — TELEPHONE ENCOUNTER
Referring Provider: Dr. Elham Clay    BMI-23.53    Are you on daily home oxygen? n    Have you had a heart or lung transplant? n      In the past year, have you had any heart related issues  Including cardiomyopathy or a MI within 6 months, that required cardiac stenting or other implantable devices? n    What type of implantable device do you have? n    Do you take nitroglycerin? If yes, how often? n    Are you currently taking any blood thinners?n      (Females) Are you currently pregnant? n  If yes, how many weeks?      Have you had a procedure in the past that was difficult to tolerate with conscious sedation? Any allergies to Fentanyl or Versed n        Do you currently use any of the following?    Are you taking any scheduled prescription narcotics more than once daily? n    Drink alcohol daily? n    Currently using any street drugs or methadone?n    Do you have any history of post-traumatic stress syndrome or mental health issues? n

## 2019-07-15 ENCOUNTER — TELEPHONE (OUTPATIENT)
Dept: GASTROENTEROLOGY | Facility: OUTPATIENT CENTER | Age: 49
End: 2019-07-15

## 2019-07-17 ENCOUNTER — TELEPHONE (OUTPATIENT)
Dept: GASTROENTEROLOGY | Facility: OUTPATIENT CENTER | Age: 49
End: 2019-07-17

## 2019-07-17 NOTE — TELEPHONE ENCOUNTER
Patient taking any blood thinners ? No     Heart disease ? Denies     Lung disease ?Denies       Sleep apnea ?Denies     Diabetic ?Denies     Kidney disease ?Denies     Electronic implanted medical devices ?Denies     Are you taking any narcotic pain medication ? Denies       PTSD ? N/a     Prep instructions reviewed with patient ? Patient declined review.  policy, MAC sedation plan reviewed. Advised patient to have someone stay with her post exam    Pharmacy : N/a    Indication for procedure : Screening colonoscopy    Referring provider :Elham Clay MD     Arrival Time : 6:30 AM

## 2019-07-22 ENCOUNTER — TRANSFERRED RECORDS (OUTPATIENT)
Dept: HEALTH INFORMATION MANAGEMENT | Facility: CLINIC | Age: 49
End: 2019-07-22

## 2019-07-22 ENCOUNTER — DOCUMENTATION ONLY (OUTPATIENT)
Dept: GASTROENTEROLOGY | Facility: OUTPATIENT CENTER | Age: 49
End: 2019-07-22
Payer: COMMERCIAL

## 2019-08-29 LAB — PAP: NORMAL

## 2019-10-22 DIAGNOSIS — F90.0 ATTENTION DEFICIT HYPERACTIVITY DISORDER (ADHD), PREDOMINANTLY INATTENTIVE TYPE: ICD-10-CM

## 2019-10-22 RX ORDER — LISDEXAMFETAMINE DIMESYLATE 30 MG/1
30 CAPSULE ORAL EVERY MORNING
Qty: 90 CAPSULE | Refills: 0 | Status: SHIPPED | OUTPATIENT
Start: 2019-11-08 | End: 2019-11-06

## 2019-10-22 NOTE — TELEPHONE ENCOUNTER
Last Office Visit: 5/8/19  Future [facility] Appointments: none  Medication last refilled: 8/10/19,  reviewed    DUE: 11/8/19    Routing refill request to provider for review/approval because: Drug not on the [facility] refill protocol   Dina Reynoso RN  10/22/19  4:31 PM

## 2019-11-03 ENCOUNTER — HEALTH MAINTENANCE LETTER (OUTPATIENT)
Age: 49
End: 2019-11-03

## 2019-11-06 ENCOUNTER — TELEPHONE (OUTPATIENT)
Dept: FAMILY MEDICINE | Facility: CLINIC | Age: 49
End: 2019-11-06

## 2019-11-06 DIAGNOSIS — F90.0 ATTENTION DEFICIT HYPERACTIVITY DISORDER (ADHD), PREDOMINANTLY INATTENTIVE TYPE: ICD-10-CM

## 2019-11-06 RX ORDER — LISDEXAMFETAMINE DIMESYLATE 30 MG/1
30 CAPSULE ORAL EVERY MORNING
Qty: 90 CAPSULE | Refills: 0 | Status: SHIPPED | OUTPATIENT
Start: 2019-11-08 | End: 2020-02-07

## 2019-11-06 NOTE — TELEPHONE ENCOUNTER
LM for pt that script for vyvanse was sent electronically to [facility] [address].   She can call back with any other questions.  Su Andrade RN  [facility]

## 2019-11-06 NOTE — TELEPHONE ENCOUNTER
Re-do of vyvanse prescription - pt wants written prescription paper for this - she chooses to get this to a mail order pharmacy. Will call [facility] where previous prescription was sent, and have them cancel that script.  Su Andrade RN  [facility]

## 2019-11-06 NOTE — TELEPHONE ENCOUNTER
[facility] Call Center    Phone Message    May a detailed message be left on voicemail: yes    Reason for Call: Other: Patient called she [facility]. Patient said she need the actual physical copy of VYVANSE and want to pick it up on Friday. Patient is out of [place] and want to get this resolved before she come back.     Action Taken: Other: [facility]

## 2020-02-06 NOTE — PROGRESS NOTES
"  SUBJECTIVE:   Nika Foreman is a 49 year old female who presents to clinic today for an annual wellness exam.    Previously followed by Dr. Clay who has retired.     # ADHD  - initially prescribed Vyvanse in  by psychiatry  - prescribing taken over by Dr. Clay  - no difficulty falling asleep  - appetite stable  - works well for her attention  - does cause dry mouth sometimes  - tries to take drug holidays when doesn't need to be as focused    Wt Readings from Last 6 Encounters:   20 53.6 kg (118 lb 4 oz)   19 53.8 kg (118 lb 8 oz)   18 54.8 kg (120 lb 12 oz)   17 52.8 kg (116 lb 8 oz)   16 51.6 kg (113 lb 11.2 oz)   16 51.3 kg (113 lb)     # Perimenopausal  - some trouble sleeping  - no night sweats  - \"major cramping\" the week before her periods   - periods are regular but more frequent, about every 3 weeks  - flow is light  - takes Naproxen 550mg for 7-8 days    - has small fibroids and was told it was not worth her time  - mother went through breast cancer so unsure of when she would have had menopause onset (BRCA negative)  - thinks MGM had osteoporosis. No history of fragility fractures  - at most 2 pack-year remote smoking history  - 1-2 alcoholic drinks 1-2 times a month  - vitamin D level 25 18     # Health Maintenance  - HIV Screening: declines  - BP:   BP Readings from Last 3 Encounters:   20 102/69   19 114/79   18 109/73   - Cholesterol:   Recent Labs   Lab Test 17  1136   CHOL 217*   HDL 88   *   TRIG 47     - Diabetes Screenin17 Glucose 79, unknown if fasting  - Feels safe at home, denies verbal/physical/emotional abuse in past year: yes  - Last Pap: 19 NIL negative hrHPV, Dr. Jo Anthony ([facility])  - Mammogram: 2019 dense breasts but no concerning masses ([facility] at [facility])  - Exercise: isometric exercise at [employer], 4-5 days a week  - 19 colonoscopy: no polyps, repeat 10 " years  - dentist last week, maybe will have a crown    Today's PHQ-2 Score:   PHQ-2 ( 1999 Pfizer) 5/8/2019 4/13/2018   Q1: Little interest or pleasure in doing things 0 0   Q2: Feeling down, depressed or hopeless 0 0   PHQ-2 Score 0 0     Review of Systems:   Constitutional - no fevers, chills, night sweats, unintentional weight loss/gain   Eyes - no vision concerns   Ears/Nose/Throat - no hearing concerns, no dysphagia/odynophagia   Cardiovascular - no chest pain, palpitations   Pulmonary - no shortness of breath, wheezing, coughing   GI - abdominal pian as above, no constipation, diarrhea, nausea, vomiting    - as above   Musculoskeletal - no joint or muscle pain  Integument - no rash   Neuro - no weakness, numbness, paresthesia   Heme - no easy bruising/bleeding   Endocrine - no polyuria, weight loss/gain, dry skin, excessive sweating, hair loss   Psychiatric - as above   Allergic/Immunologic - no history of anaphylaxis, no history of recurrent infections    Past Medical History:   Diagnosis Date     ADHD (attention deficit hyperactivity disorder)     Vyvanse 30 mg     Allergic rhinitis, cause unspecified 8/27/12     Cervical dysplasia 2000     Dysmenorrhea      Raynaud's disease      TMJ (dislocation of temporomandibular joint)      Vitamin D insufficiency 5/2/12     Past Surgical History:   Procedure Laterality Date     ADENOIDECTOMY       MANDIBLE SURGERY  2006     TONSILLECTOMY       Family History   Problem Relation Age of Onset     Breast Cancer Mother      Osteoporosis Maternal Grandmother      Breast Cancer Other         MGM's sister     Heart Disease No family hx of      Ovarian Cancer No family hx of      Colon Cancer No family hx of      Social History     Tobacco Use     Smoking status: Former Smoker     Packs/day: 1.00     Years: 2.00     Pack years: 2.00     Smokeless tobacco: Never Used   Substance Use Topics     Alcohol use: Yes     Frequency: 2-4 times a month     Drinks per session: 1 or 2     " Binge frequency: Never     Drug use: No     Social History     Social History Narrative    Lives with     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      I have reviewed the patient's past medical, surgical, family, and social history.     OBJECTIVE:   /69   Pulse 69   Temp 98  F (36.7  C) (Oral)   Resp 16   Ht 1.513 m (4' 11.57\")   Wt 53.6 kg (118 lb 4 oz)   LMP 01/17/2020 (Approximate)   SpO2 98%   Breastfeeding No   BMI 23.43 kg/m      Constitutional: well-appearing, appears stated age  Eyes: conjunctivae without erythema, sclera anicteric. Pupils equal, round, and reactive to light.   ENT: oropharynx clear, TM grey bilateral  Cardiac: regular rate and rhythm, normal S1/S2, no murmur/rubs/gallops  Respiratory: lungs clear to auscultation bilaterally, normal work of breathing, no wheezes/crackles  GI: abdomen soft, non-tender, non-distended  Extremities: warm and well perfused, radial pulses 2+ and equal, cap refill brisk.  Lymph: no cervical or supraclavicular lymphadenopathy  Skin: no rashes, lesions, or wounds  Psych: affect is full and appropriate, speech is fluent and non-pressured    ASSESSMENT AND PLAN:     COUNSELING:   Reviewed preventive health counseling, as reflected in patient instructions       Healthy diet/nutrition       Vision screening       Immunizations    Declined: Influenza       (Z01.419) Well woman exam  (primary encounter diagnosis)  Comment: Age appropriate screening and preventive services provided. BP normal. Depression screening negative. ASCVD risk very low and up to date on lipid and diabetes screening. Up to date on mammogram and Pap smear. Up to date on TDaP. Declines influenza vaccination. Opted for early colonoscopy screening and was normal.   Plan:     (F90.0) " Attention deficit hyperactivity disorder (ADHD), predominantly inattentive type  Comment: Symptoms well controlled with low-dose Vyvanse. Reviewed potential side effects with Nika and she is tolerating it well. Reviewed chart and no concerns for misuse. Refilled Vyvanse.     I have queried the [place] Prescription Monitoring Program for this patient to determine if they are an appropriate candidate for a controlled substance prescription. There is no evidence of prescription misuse based on this  search.  Plan: lisdexamfetamine (VYVANSE) 30 MG capsule          (N94.6) Dysmenorrhea  Comment: Not interested in contraception. Not interested in hysterectomy. Discussed home care measures. Has discussed with her ob/gyn in the past who recommended symptomatic treatment. Refilled naproxen with counseling about possible NSAID side effects. Consider more potent NSAIDs in future (diclofenac, mobic).   Plan: naproxen sodium (ANAPROX) 550 MG tablet            Bertrand Newell MD  [facility]  02/07/2020, 3:23 PM

## 2020-02-07 ENCOUNTER — OFFICE VISIT (OUTPATIENT)
Dept: FAMILY MEDICINE | Facility: CLINIC | Age: 50
End: 2020-02-07
Payer: COMMERCIAL

## 2020-02-07 VITALS
HEIGHT: 60 IN | TEMPERATURE: 98 F | HEART RATE: 69 BPM | BODY MASS INDEX: 23.22 KG/M2 | OXYGEN SATURATION: 98 % | SYSTOLIC BLOOD PRESSURE: 102 MMHG | DIASTOLIC BLOOD PRESSURE: 69 MMHG | WEIGHT: 118.25 LBS | RESPIRATION RATE: 16 BRPM

## 2020-02-07 DIAGNOSIS — F90.0 ATTENTION DEFICIT HYPERACTIVITY DISORDER (ADHD), PREDOMINANTLY INATTENTIVE TYPE: ICD-10-CM

## 2020-02-07 DIAGNOSIS — Z01.419 WELL WOMAN EXAM: Primary | ICD-10-CM

## 2020-02-07 DIAGNOSIS — N94.6 DYSMENORRHEA: ICD-10-CM

## 2020-02-07 PROBLEM — J30.2 SEASONAL ALLERGIC RHINITIS: Chronic | Status: ACTIVE | Noted: 2020-02-07

## 2020-02-07 RX ORDER — LISDEXAMFETAMINE DIMESYLATE 30 MG/1
30 CAPSULE ORAL EVERY MORNING
Qty: 90 CAPSULE | Refills: 0 | Status: SHIPPED | OUTPATIENT
Start: 2020-02-07 | End: 2020-05-06

## 2020-02-07 RX ORDER — NAPROXEN SODIUM 550 MG/1
550 TABLET ORAL 2 TIMES DAILY WITH MEALS
Qty: 60 TABLET | Refills: 1 | Status: SHIPPED | OUTPATIENT
Start: 2020-02-07

## 2020-02-07 SDOH — HEALTH STABILITY: MENTAL HEALTH: HOW OFTEN DO YOU HAVE A DRINK CONTAINING ALCOHOL?: 2-4 TIMES A MONTH

## 2020-02-07 SDOH — HEALTH STABILITY: MENTAL HEALTH: HOW MANY STANDARD DRINKS CONTAINING ALCOHOL DO YOU HAVE ON A TYPICAL DAY?: 1 OR 2

## 2020-02-07 SDOH — HEALTH STABILITY: MENTAL HEALTH: HOW OFTEN DO YOU HAVE 6 OR MORE DRINKS ON ONE OCCASION?: NEVER

## 2020-02-07 ASSESSMENT — MIFFLIN-ST. JEOR: SCORE: 1086.01

## 2020-02-07 NOTE — NURSING NOTE
"49 year old  Chief Complaint   Patient presents with     Physical     no concerns needs her vyvanse renewed. Wants to know if the Rx can be faxed to the pharmacy. also requesting a hand Rx for anaprox and vyvanse.       Blood pressure 102/69, pulse 69, temperature 98  F (36.7  C), temperature source Oral, resp. rate 16, height 1.513 m (4' 11.57\"), weight 53.6 kg (118 lb 4 oz), last menstrual period 01/17/2020, SpO2 98 %, not currently breastfeeding. Body mass index is 23.43 kg/m .  Patient Active Problem List   Diagnosis     Breast lump in upper outer quadrant     Attention deficit hyperactivity disorder (ADHD)     Numbness and tingling       Wt Readings from Last 2 Encounters:   02/07/20 53.6 kg (118 lb 4 oz)   05/08/19 53.8 kg (118 lb 8 oz)     BP Readings from Last 3 Encounters:   02/07/20 102/69   05/08/19 114/79   04/13/18 109/73         Current Outpatient Medications   Medication     lisdexamfetamine (VYVANSE) 30 MG capsule     naproxen sodium (ANAPROX) 550 MG tablet     No current facility-administered medications for this visit.        Social History     Tobacco Use     Smoking status: Never Smoker     Smokeless tobacco: Never Used   Substance Use Topics     Alcohol use: Yes     Drug use: No       Health Maintenance Due   Topic Date Due     HIV SCREENING  02[birth year]12/1987     PAP  07/07/2019     INFLUENZA VACCINE (1) 09/01/2019     PHQ-2  01/01/2020       No results found for: PAP      February 7, 2020 11:19 AM  "

## 2020-05-04 DIAGNOSIS — F90.0 ATTENTION DEFICIT HYPERACTIVITY DISORDER (ADHD), PREDOMINANTLY INATTENTIVE TYPE: ICD-10-CM

## 2020-05-04 NOTE — TELEPHONE ENCOUNTER
Last time prescribed: 2/7/20 , 90 tabs/caps x 0 refills - checked   Last office visit: 2/7/20  Next appointment: No Future Appointment Scheduled    Su Andrade RN  [facility]

## 2020-05-06 RX ORDER — LISDEXAMFETAMINE DIMESYLATE 30 MG/1
30 CAPSULE ORAL EVERY MORNING
Qty: 90 CAPSULE | Refills: 0 | Status: SHIPPED | OUTPATIENT
Start: 2020-05-06 | End: 2020-05-22

## 2020-05-18 DIAGNOSIS — F90.0 ATTENTION DEFICIT HYPERACTIVITY DISORDER (ADHD), PREDOMINANTLY INATTENTIVE TYPE: ICD-10-CM

## 2020-05-18 NOTE — TELEPHONE ENCOUNTER
"Dr. Newell - see this below, Please approve this replacement Rx if appropriate.     Patient states Vyvanse medication that was sent in the mail from mail-order pharmacy has been \"lost in the mail\". She states that the pharmacy will be sending a request for a new Rx to replace this order that was lost.     Order sent electronically to [facility] on 5/6/20 #90      does show the Rx was filled 5/6/20 from a [facility] in [place], [place] and this looks like a distribution center.     Pharmacy called: They have requested return to sender at next scanning of package, requesting a new Rx sent to mail order pharmacy.    Dina Reynoso RN  05/22/20  9:48 AM    "

## 2020-05-18 NOTE — TELEPHONE ENCOUNTER
[facility] Call Center    Phone Message    May a detailed message be left on voicemail: yes     Reason for Call: Other:      [facility] - [place], [place] - [facility] [address]     Prescription got lost in the mail. Pharmacy will be reaching out for another prescription.     Please send a new prescription ASAP as she is out and she needs this filled.        Please also call Nika back with an update when that new prescription is sent.       Action Taken: Message routed to:  [facility]: PCC    Travel Screening: Not Applicable

## 2020-05-21 ENCOUNTER — TELEPHONE (OUTPATIENT)
Dept: FAMILY MEDICINE | Facility: CLINIC | Age: 50
End: 2020-05-21

## 2020-05-21 NOTE — TELEPHONE ENCOUNTER
Patient called again to see if we had made any progress processing a new prescription.    Please call her on .    Luann Morgan RN

## 2020-05-21 NOTE — TELEPHONE ENCOUNTER
[facility] Call Center    Phone Message    May a detailed message be left on voicemail: no     Reason for Call: Medication Question or concern regarding medication   Prescription Clarification  Name of Medication: lisdexamfetamine (VYVANSE) 30 MG capsule   Prescribing Provider: Dr. Newell   Pharmacy: [facility]   What on the order needs clarification? Pharmacy reports Pt's meds were sent out, however package was lost by postal service. They have requested return to sender at next scanning of package. They are requesting a new script order since Pt never received original supply, Pt is out of her med. Please call back for any issues.            Action Taken: Message routed to:  [facility] ([facility] NURSE POOL    Travel Screening: Not Applicable

## 2020-05-22 DIAGNOSIS — F90.0 ATTENTION DEFICIT HYPERACTIVITY DISORDER (ADHD), PREDOMINANTLY INATTENTIVE TYPE: ICD-10-CM

## 2020-05-22 RX ORDER — LISDEXAMFETAMINE DIMESYLATE 30 MG/1
30 CAPSULE ORAL EVERY MORNING
Qty: 90 CAPSULE | Refills: 0 | Status: SHIPPED | OUTPATIENT
Start: 2020-05-22 | End: 2020-05-22

## 2020-05-22 RX ORDER — LISDEXAMFETAMINE DIMESYLATE 30 MG/1
30 CAPSULE ORAL EVERY MORNING
Qty: 90 CAPSULE | Refills: 0 | Status: SHIPPED | OUTPATIENT
Start: 2020-05-22 | End: 2020-08-18

## 2020-05-22 NOTE — TELEPHONE ENCOUNTER
Received call from [facility] - they accidentally cancelled vyvance script sent today - will resend to them today.  Pharmacy will overnight script to pt ; they will contact her re; this yet today.  Su Andrade RN  [facility]

## 2020-05-22 NOTE — TELEPHONE ENCOUNTER
[facility]    Phone Message    May a detailed message be left on voicemail: yes     Reason for Call: Medication Refill Request    Has the patient contacted the pharmacy for the refill? Yes   Name of medication being requested: lisdexamfetamine (VYVANSE) 30 MG capsul  Provider who prescribed the medication: Dr. Newell  [facility] [place] [id number]   Date medication is needed: 5/22/20   Nika calling to request that the care team calls [facility] at [phone] in order to get her prescription. Nika requests that it is stated that this is an urgent 2 day order. Nika says that her prescription was lost, and she is very concerned about not having it for a while since she is out. Nika would like to also request a call back from the care team when this has been done, so that she can get an update. Please give Nika a call back at your earliest convenience with an update, or to ask any questions.       Action Taken: Message routed to:  [facility]: Primary Care    Travel Screening: Not Applicable

## 2020-05-22 NOTE — TELEPHONE ENCOUNTER
Vyvanse Rx resent as requested.    Nika was seen today for refill request.    Diagnoses and all orders for this visit:    Attention deficit hyperactivity disorder (ADHD), predominantly inattentive type  -     lisdexamfetamine (VYVANSE) 30 MG capsule; Take 1 capsule (30 mg) by mouth every morning      Luis Colin MD  2:28 PM, May 22, 2020

## 2020-05-22 NOTE — TELEPHONE ENCOUNTER
Resending vyvanse script - mail order pharmacy accidentally cancelled the script, and needs a new one.  Su Andrade RN  [facility]

## 2020-05-28 ENCOUNTER — TRANSFERRED RECORDS (OUTPATIENT)
Dept: HEALTH INFORMATION MANAGEMENT | Facility: CLINIC | Age: 50
End: 2020-05-28

## 2020-08-17 DIAGNOSIS — F90.0 ATTENTION DEFICIT HYPERACTIVITY DISORDER (ADHD), PREDOMINANTLY INATTENTIVE TYPE: ICD-10-CM

## 2020-08-17 NOTE — TELEPHONE ENCOUNTER
[facility] Call Center    Phone Message    May a detailed message be left on voicemail: yes     Reason for Call: Medication Refill Request    Has the patient contacted the pharmacy for the refill? Yes   Name of medication being requested: Vyvanse 90 day  Provider who prescribed the medication: Reece  Pharmacy: [facility] - [address]   Date medication is needed: ASAP - Please call pt when sent, needs done urgently      Action Taken: Message routed to:  [facility]: Nurses    Travel Screening: Not Applicable

## 2020-08-18 NOTE — TELEPHONE ENCOUNTER
Last visit 2/7/20  Last refill 5/23/20 - checked  - OK for refill 8/21/20  Su Andrade RN  [facility]

## 2020-08-19 RX ORDER — LISDEXAMFETAMINE DIMESYLATE 30 MG/1
30 CAPSULE ORAL EVERY MORNING
Qty: 90 CAPSULE | Refills: 0 | Status: SHIPPED | OUTPATIENT
Start: 2020-08-21 | End: 2020-12-08

## 2020-11-16 ENCOUNTER — HEALTH MAINTENANCE LETTER (OUTPATIENT)
Age: 50
End: 2020-11-16

## 2020-12-04 DIAGNOSIS — F90.0 ATTENTION DEFICIT HYPERACTIVITY DISORDER (ADHD), PREDOMINANTLY INATTENTIVE TYPE: ICD-10-CM

## 2020-12-04 NOTE — TELEPHONE ENCOUNTER
[facility] Call Center    Phone Message    May a detailed message be left on voicemail: yes     Reason for Call: Medication Refill Request    Has the patient contacted the pharmacy for the refill? Yes   Name of medication being requested: lisdexamfetamine (VYVANSE) 30 MG capsule  Provider who prescribed the medication: Dr Newell  Pharmacy:      [facility] - [address]    [phone]  Call it in here or fax    Date medication is needed    ASAP    Action Taken: Message routed to:  [facility]    Travel Screening: Not Applicable

## 2020-12-09 RX ORDER — LISDEXAMFETAMINE DIMESYLATE 30 MG/1
30 CAPSULE ORAL EVERY MORNING
Qty: 90 CAPSULE | Refills: 0 | Status: SHIPPED | OUTPATIENT
Start: 2020-12-09 | End: 2021-02-15

## 2020-12-10 ENCOUNTER — TELEPHONE (OUTPATIENT)
Dept: FAMILY MEDICINE | Facility: CLINIC | Age: 50
End: 2020-12-10

## 2020-12-10 NOTE — TELEPHONE ENCOUNTER
Central Prior Authorization Team   [phone]    PA Initiation    Medication: vyvanse 30 mg  Insurance Company: Comment:  [employer] [place]   Pharmacy Filling the Rx: [facility] - [place], [place] - [address]  Filling Pharmacy Phone: [phone]  Filling Pharmacy Fax: [phone]  Start Date: 12/10/2020

## 2020-12-10 NOTE — TELEPHONE ENCOUNTER
Prior Authorization Retail Medication Request    Medication/Dose: vyvanse 30 mg  ICD code (if different than what is on RX):  same  Previously Tried and Failed:    Rationale:      Insurance Name:  same  Insurance ID:  same      Pharmacy Information (if different than what is on RX)  Name:  same  Phone:  same  Su Andrade RN  [facility]

## 2020-12-10 NOTE — TELEPHONE ENCOUNTER
Prior Authorization Approval          Authorization Effective Date: 12/10/2020  Authorization Expiration Date: 12/10/2021  Medication: vyvanse 30 mg-[place] APPROVED   Approved Dose/Quantity:   Reference #: CASE # [id number]   Insurance Company: Comment:  [employer] [place]   Expected CoPay:       CoPay Card Available:      Foundation Assistance Needed:    Which Pharmacy is filling the prescription (Not needed for infusion/clinic administered): [facility] - [place], [place] - [address] [place] [address]  Pharmacy Notified: Yes- **Instructed pharmacy to notify patient when script is ready to /ship.**   Patient Notified: Yes

## 2021-02-15 ENCOUNTER — OFFICE VISIT (OUTPATIENT)
Dept: FAMILY MEDICINE | Facility: CLINIC | Age: 51
End: 2021-02-15
Payer: COMMERCIAL

## 2021-02-15 VITALS
SYSTOLIC BLOOD PRESSURE: 104 MMHG | TEMPERATURE: 97.3 F | BODY MASS INDEX: 23.56 KG/M2 | DIASTOLIC BLOOD PRESSURE: 71 MMHG | RESPIRATION RATE: 15 BRPM | OXYGEN SATURATION: 100 % | WEIGHT: 120 LBS | HEIGHT: 60 IN | HEART RATE: 70 BPM

## 2021-02-15 DIAGNOSIS — F90.0 ATTENTION DEFICIT HYPERACTIVITY DISORDER (ADHD), PREDOMINANTLY INATTENTIVE TYPE: ICD-10-CM

## 2021-02-15 RX ORDER — LISDEXAMFETAMINE DIMESYLATE 30 MG/1
30 CAPSULE ORAL EVERY MORNING
Qty: 90 CAPSULE | Refills: 0 | Status: SHIPPED | OUTPATIENT
Start: 2021-03-06 | End: 2021-06-28

## 2021-02-15 SDOH — HEALTH STABILITY: MENTAL HEALTH: HOW OFTEN DO YOU HAVE A DRINK CONTAINING ALCOHOL?: 2-3 TIMES A WEEK

## 2021-02-15 ASSESSMENT — MIFFLIN-ST. JEOR: SCORE: 1082.88

## 2021-02-15 NOTE — PROGRESS NOTES
Nika Foreman presents to [facility] today to have an annual exam and receive a refill of her medication for ADHD.  She has been on this medication for many years.  It was formally prescribed by Dr. Clay and then Dr. Newell.  She reports that her symptoms are stable.  She is not in therapy.  She is having no complaints or concerns in terms of her ADHD.     Reports some Raynaud's in her fingertips and occasionally in her toes.    Also with some dysmenorrhea for which she sees gynecology and also a physician who does chiropractic manipulation.    Social History     Social History Narrative    Lives with     Works in [employer] ([employer] and [employer])    Originally from [place].    Came to [place] in 2000 to work for [employer].          is a colon cancer survivor.          ROS  PHQ-2 Score:     PHQ-2 ( 1999 Pfizer) 2/15/2021 5/8/2019   Q1: Little interest or pleasure in doing things 0 0   Q2: Feeling down, depressed or hopeless 0 0   PHQ-2 Score 0 0       CONSTITUTIONAL:NEGATIVE for fever, chills, change in weight  INTEGUMENTARY/SKIN: NEGATIVE for worrisome rashes, moles or lesions  EYES: NEGATIVE for vision changes or irritation  ENT/MOUTH: NEGATIVE for ear, mouth and throat problems  RESP:NEGATIVE for significant cough or SOB  CV: NEGATIVE for chest pain, palpitations, MANUEL, orthopnea, PND  or peripheral edema  GI: NEGATIVE for nausea, abdominal pain, heartburn, or change in bowel habits  :NEGATIVE for frequency, dysuria, or hematuria  MUSCULOSKELETAL:NEGATIVE for significant arthralgias or myalgia  NEURO: NEGATIVE for weakness, dizziness or paresthesias  ENDOCRINE: NEGATIVE for polyuria/dipsia,  temperature intolerance, skin/hair changes  HEME/ALLERGY/IMMUNE: NEGATIVE for bleeding problems  PSYCHIATRIC: NEGATIVE for changes in mood or affect          Her physical activity - About 5 times/week at a gym.     Her diet - Is healthy.     Her sleep is  occasionally interrupted.     Alcohol intake = about 3 nights/week. One drink/night  She does not.  use tobacco products.        Wears seat belt.--Yes.  Wears bike helmet--Yes. .      Her last dental check up was a few months ago.     Colorectal cancer screening - Had colonoscopy with Dr. Winston.        FEMALE Specific health issues  Still having menses. Now with more cramping.   Sees Dr. Siddiqui, a specialist who does manipulation that helps.   Also, sees Dr. Anthony, a Gynecologist.       Pap smears Aug., 2020.   Breast exams - Dense tissue  Last mammogram - May 2020. Gets annual with 3d imaging. Mom was a breast cancer survivor.   Contraception: None.   STD concerns: None.         Current Outpatient Medications   Medication Sig Dispense Refill     lisdexamfetamine (VYVANSE) 30 MG capsule Take 1 capsule (30 mg) by mouth every morning Needs clinic appt for further refills 90 capsule 0     naproxen sodium (ANAPROX) 550 MG tablet Take 1 tablet (550 mg) by mouth 2 times daily (with meals) 60 tablet 1     No Known Allergies    Health Maintenance   Topic Date Due     HIV SCREENING  02/12/1987     HEPATITIS C SCREENING  02/12/1990     INFLUENZA VACCINE (1) 09/01/2020     PREVENTIVE CARE VISIT  02/07/2021     ZOSTER IMMUNIZATION (1 of 2) 02/12/2022     DTAP/TDAP/TD IMMUNIZATION (3 - Td) 05/01/2022     LIPID  06/09/2022     PAP  08/29/2022     PHQ-2  Completed     Pneumococcal Vaccine: Pediatrics (0 to 5 Years) and At-Risk Patients (6 to 64 Years)  Aged Out     IPV IMMUNIZATION  Aged Out     MENINGITIS IMMUNIZATION  Aged Out     HEPATITIS B IMMUNIZATION  Aged Out     MAMMO SCREENING  Discontinued     Doesn't want flu vaccine.     Patient Active Problem List   Diagnosis     Attention deficit hyperactivity disorder (ADHD)     Raynaud's phenomenon without gangrene     Seasonal allergic rhinitis   - Diagnosed in 2010 with ADHD    Past Surgical History:   Procedure Laterality Date     ADENOIDECTOMY       MANDIBLE SURGERY  2006      "TONSILLECTOMY         Family History   Problem Relation Age of Onset     Breast Cancer Mother      Osteoporosis Maternal Grandmother      Breast Cancer Other         MGM's sister     Heart Disease No family hx of      Ovarian Cancer No family hx of      Colon Cancer No family hx of        Immunizations are as follows:      Immunization History   Administered Date(s) Administered     HEPA 04/22/2003, 04/22/2004     TD (ADULT, 7+) 10/01/2002     Tdap (Adacel,Boostrix) 05/01/2012       EXAM    Vitals: /71 (BP Location: Left arm, Patient Position: Sitting, Cuff Size: Adult Regular)   Pulse 70   Temp 97.3  F (36.3  C) (Skin)   Resp 15   Ht 1.511 m (4' 11.5\")   Wt 54.4 kg (120 lb)   LMP 02/02/2021 (Exact Date)   SpO2 100%   BMI 23.83 kg/m    BMI= Body mass index is 23.83 kg/m .  Physically fit and well-appearing.  In no distress.      EYES: PERRL, EOMI, conjunctiva clear  HENT: TM normal bilaterally with some slight earwax in the right ear.  NECK: no adenopathy, thyroid normal to palpation   RESP: lungs clear to auscultation bilaterally    CV: regular rate and rhythm, normal S1 S2, no murmur, no carotid bruits  ABDOMEN: soft, nontender, without HSM or masses. Bowel sounds normal   and Rectal exam: deferred  MS: extremities normal- no gross deformities noted, no tender, hot or swollen joints.   SKIN: no suspicious lesions or rashes  NEURO: Normal strength and tone, sensory exam grossly normal  PSYCH: mentation appears normal. and affect normal/bright.  EXT: no peripheral edema          IMPRESSION  Nika is a healthy 52 yo who is having some perimenopausal symptoms.   Also, with low Vitamin D   And, ADHD symptoms are well controlled on Vyvanse    ASSESSMENT/PLAN:    Current visit issues:  -Refilled Vyvanse. She's been on it for years and without problems. Given 90 days and then no refill. She calls for the other 3.   -Has gynecologist in [facility]  - Discussed checking Lipids, but Nika said she " gets that checked at her Gynecologist.   -Sees a Dermatologist for her skin issues.   -For ear wax, use Debrox drops, a few into each ear for a few days in a row every few weeks.   -Follow up in a year, sooner as needed.    --Dmitry Eisenberg MD

## 2021-02-15 NOTE — NURSING NOTE
"51 year old  Chief Complaint   Patient presents with     Physical     no other concerns       Blood pressure 104/71, pulse 70, temperature 97.3  F (36.3  C), temperature source Skin, resp. rate 15, height 1.511 m (4' 11.5\"), weight 54.4 kg (120 lb), last menstrual period 02/02/2021, SpO2 100 %, not currently breastfeeding. Body mass index is 23.83 kg/m .  Patient Active Problem List   Diagnosis     Attention deficit hyperactivity disorder (ADHD)     Raynaud's phenomenon without gangrene     Seasonal allergic rhinitis       Wt Readings from Last 2 Encounters:   02/15/21 54.4 kg (120 lb)   02/07/20 53.6 kg (118 lb 4 oz)     BP Readings from Last 3 Encounters:   02/15/21 104/71   02/07/20 102/69   05/08/19 114/79         Current Outpatient Medications   Medication     lisdexamfetamine (VYVANSE) 30 MG capsule     naproxen sodium (ANAPROX) 550 MG tablet     No current facility-administered medications for this visit.        Social History     Tobacco Use     Smoking status: Former Smoker     Packs/day: 1.00     Years: 2.00     Pack years: 2.00     Smokeless tobacco: Never Used   Substance Use Topics     Alcohol use: Yes     Frequency: 2-3 times a week     Drinks per session: 1 or 2     Binge frequency: Never     Drug use: No       Health Maintenance Due   Topic Date Due     HIV SCREENING  02/12/1987     HEPATITIS C SCREENING  02/12/1990     INFLUENZA VACCINE (1) 09/01/2020     PREVENTIVE CARE VISIT  02/07/2021       Lab Results   Component Value Date    PAP negative for intraepithelial lesion 08/29/2019         February 15, 2021 9:19 AM    "

## 2021-02-15 NOTE — PATIENT INSTRUCTIONS
IMPRESSION  Nika is a healthy 52 yo who is having some perimenopausal symptoms.   Also, with low Vitamin D   And, ADHD symptoms are well controlled on Vyvanse    ASSESSMENT/PLAN:    Current visit issues:  -Refilled Vyvanse. She's been on it for years and without problems. Given 90 days and then no refill. She calls for the other 3.   -Has gynecologist in [facility]  - Discussed checking Lipids, but Nika said she gets that checked at her Gynecologist.   -Sees a Dermatologist for her skin issues.   -For ear wax, use Debrox drops, a few into each ear for a few days in a row every few weeks.   -Follow up in a year, sooner as needed.    --Dmitry Eisenberg MD

## 2021-06-28 ENCOUNTER — TELEPHONE (OUTPATIENT)
Dept: FAMILY MEDICINE | Facility: CLINIC | Age: 51
End: 2021-06-28

## 2021-06-28 DIAGNOSIS — F90.0 ATTENTION DEFICIT HYPERACTIVITY DISORDER (ADHD), PREDOMINANTLY INATTENTIVE TYPE: ICD-10-CM

## 2021-06-28 RX ORDER — LISDEXAMFETAMINE DIMESYLATE 30 MG/1
30 CAPSULE ORAL EVERY MORNING
Qty: 90 CAPSULE | Refills: 0 | Status: SHIPPED | OUTPATIENT
Start: 2021-06-28 | End: 2021-09-30

## 2021-06-28 NOTE — TELEPHONE ENCOUNTER
Last Office Visit: 2/15/21 with Elgin, and ADHD discussed , no dosage changes done at this time   [facility] [place]: None  Medication last refilled: 3/6/21 for # 90 per     RX [place] reviewed:  reviewed- no concerns    [place]:  [email or web address]    Routing refill request to provider for review/approval because:  Drug not on the [facility] refill protocol     Katey Romo RN  June 28, 2021 12:21 PM

## 2021-06-28 NOTE — TELEPHONE ENCOUNTER
Who is calling? patient  Medication name: VYVANSE  Is this a refill request? Yes  Have they contacted the pharmacy?  No  Pharmacy:  [facility] - [place], [place] - [address] [place] [address] Court  Question/Concern: patient kept saying its a mail order and to make sure this if filled   Would patient like a call back? Yes if any issues    Refill encounter placed, and sent to Reece to authorize.  He stated still is primary, [place] last filled and saw her this year in Feb    Katey Romo RN  June 28, 2021 12:27 PM

## 2021-08-10 LAB
HPV ABSTRACT: NORMAL
PAP-ABSTRACT: NORMAL

## 2021-09-18 ENCOUNTER — HEALTH MAINTENANCE LETTER (OUTPATIENT)
Age: 51
End: 2021-09-18

## 2021-09-30 DIAGNOSIS — F90.0 ATTENTION DEFICIT HYPERACTIVITY DISORDER (ADHD), PREDOMINANTLY INATTENTIVE TYPE: ICD-10-CM

## 2021-09-30 RX ORDER — LISDEXAMFETAMINE DIMESYLATE 30 MG/1
30 CAPSULE ORAL EVERY MORNING
Qty: 90 CAPSULE | Refills: 0 | Status: SHIPPED | OUTPATIENT
Start: 2021-09-30 | End: 2021-12-30

## 2021-09-30 NOTE — TELEPHONE ENCOUNTER
Vyvanse 30 mg    Last Office Visit: 2/15/21  [facility] [place]  Medication last refilled: 6/28/21 #90 with 0 refill(s)     verified - last fill date: 6/28/21 #90    Routing refill request to provider for review/approval because:  Drug not on the [facility] refill protocol     Nika does not read Meetmeals messages. Request sent to the schedulers to call patient and schedule a 6-month ADHD appointment.    Paige Marti RN, BSN

## 2021-09-30 NOTE — TELEPHONE ENCOUNTER
Who is calling? Patient   Medication name: VYVANSE  30 MG   Is this a refill request? Yes  Have they contacted the pharmacy?  No  Pharmacy:   [facility] - [place], [place] - [address]  [address]  [address]  [facility] [address]  Phone: [phone] Fax: [phone]    Question/Concern: Call if theres any issues and its been sent   Would patient like a call back? Yes

## 2021-10-07 ENCOUNTER — TELEPHONE (OUTPATIENT)
Dept: FAMILY MEDICINE | Facility: CLINIC | Age: 51
End: 2021-10-07

## 2021-12-30 DIAGNOSIS — F90.0 ATTENTION DEFICIT HYPERACTIVITY DISORDER (ADHD), PREDOMINANTLY INATTENTIVE TYPE: ICD-10-CM

## 2021-12-30 RX ORDER — LISDEXAMFETAMINE DIMESYLATE 30 MG/1
30 CAPSULE ORAL EVERY MORNING
Qty: 90 CAPSULE | Refills: 0 | Status: SHIPPED | OUTPATIENT
Start: 2021-12-30 | End: 2022-03-02

## 2021-12-30 NOTE — TELEPHONE ENCOUNTER
Who is calling? Patient  Medication name: lisdexamfetamine (VYVANSE) 30 MG capsule  Is this a refill request? Yes  Have they contacted the pharmacy?  Yes  Pharmacy:   [facility] - [place], [place] - [address]  [address]  [address]  [facility] [place] [address]  Phone: [phone] Fax: [phone]  Question/Concern: refill request for 90 days. Physical scheduled 2/16/22  Would patient like a call back? No

## 2021-12-30 NOTE — TELEPHONE ENCOUNTER
Lisdexamfetamine (Vyvanse) 30 mg    Last Office Visit: 2/15/21  [facility] [place]: 2/16/22  Medication last refilled: 9/30/21 #90 with 0 refill(s)     verified - last fill date: 9/30/21 #90    Routing refill request to provider for review/approval because:  Drug not on the [facility] refill protocol     Paige Marti RN, BSN

## 2021-12-31 NOTE — TELEPHONE ENCOUNTER
Agreed to one time refill as noted below during Dr. Eisenberg's absence.    Nika was seen today for refill request.    Diagnoses and all orders for this visit:    Attention deficit hyperactivity disorder (ADHD), predominantly inattentive type  -     lisdexamfetamine (VYVANSE) 30 MG capsule; Take 1 capsule (30 mg) by mouth every morning      Luis Colin MD  6:20 PM, December 30, 2021

## 2022-03-02 ENCOUNTER — OFFICE VISIT (OUTPATIENT)
Dept: FAMILY MEDICINE | Facility: CLINIC | Age: 52
End: 2022-03-02

## 2022-03-02 VITALS
WEIGHT: 117.5 LBS | TEMPERATURE: 98.2 F | SYSTOLIC BLOOD PRESSURE: 108 MMHG | HEART RATE: 78 BPM | RESPIRATION RATE: 15 BRPM | DIASTOLIC BLOOD PRESSURE: 76 MMHG | OXYGEN SATURATION: 99 % | HEIGHT: 60 IN | BODY MASS INDEX: 23.07 KG/M2

## 2022-03-02 DIAGNOSIS — Z13.6 SCREENING FOR CARDIOVASCULAR CONDITION: ICD-10-CM

## 2022-03-02 DIAGNOSIS — E55.9 VITAMIN D DEFICIENCY: ICD-10-CM

## 2022-03-02 DIAGNOSIS — Z13.1 SCREENING FOR DIABETES MELLITUS: Primary | ICD-10-CM

## 2022-03-02 DIAGNOSIS — F90.0 ATTENTION DEFICIT HYPERACTIVITY DISORDER (ADHD), PREDOMINANTLY INATTENTIVE TYPE: ICD-10-CM

## 2022-03-02 DIAGNOSIS — Z00.00 ANNUAL PHYSICAL EXAM: ICD-10-CM

## 2022-03-02 LAB
AMPHETAMINES UR QL SCN: ABNORMAL
BARBITURATES UR QL: ABNORMAL
BENZODIAZ UR QL: ABNORMAL
CANNABINOIDS UR QL SCN: ABNORMAL
CHOLEST SERPL-MCNC: 230 MG/DL
COCAINE UR QL: ABNORMAL
DEPRECATED CALCIDIOL+CALCIFEROL SERPL-MC: 28 UG/L (ref 20–75)
ETHANOL UR QL SCN: ABNORMAL
FASTING STATUS PATIENT QL REPORTED: ABNORMAL
FASTING STATUS PATIENT QL REPORTED: NORMAL
GLUCOSE BLD-MCNC: 95 MG/DL (ref 70–99)
HDLC SERPL-MCNC: 90 MG/DL
LDLC SERPL CALC-MCNC: 124 MG/DL
NONHDLC SERPL-MCNC: 140 MG/DL
OPIATES UR QL SCN: ABNORMAL
PCP UR QL SCN: ABNORMAL
TRIGL SERPL-MCNC: 81 MG/DL

## 2022-03-02 PROCEDURE — 80061 LIPID PANEL: CPT | Performed by: FAMILY MEDICINE

## 2022-03-02 PROCEDURE — 80307 DRUG TEST PRSMV CHEM ANLYZR: CPT | Performed by: FAMILY MEDICINE

## 2022-03-02 PROCEDURE — 82947 ASSAY GLUCOSE BLOOD QUANT: CPT | Performed by: FAMILY MEDICINE

## 2022-03-02 PROCEDURE — 82306 VITAMIN D 25 HYDROXY: CPT | Performed by: FAMILY MEDICINE

## 2022-03-02 RX ORDER — LISDEXAMFETAMINE DIMESYLATE 30 MG/1
30 CAPSULE ORAL EVERY MORNING
Qty: 90 CAPSULE | Refills: 0 | Status: SHIPPED | OUTPATIENT
Start: 2022-03-30 | End: 2022-04-15

## 2022-03-02 NOTE — PATIENT INSTRUCTIONS
ASSESSMENT/PLAN:    Annual Exam/Preventive Issues   -Check Lipids and diabetes screen  -Discussed shingles vaccine. She wants to wait as may be changing jobs.   -Declined influenza vaccines.   -For women's health issues, Nika follows with Gynecology    - Will check Vit D as history of low Vit D    -Specific concerns:     1. Refilled Vyvanse for 90 days. Start date 3/30   She will call in for a 90 day refill in 3 months  - Check urine drug screen today.     2. Fatigue  - Discussed checking for anemia, iron deficiency and also for TSH.   Nika will return to clinic for assessment of those    -Follow up: for discussion of fatigue    Dmitry Eisenberg MD, MS

## 2022-03-02 NOTE — NURSING NOTE
"52 year old  Chief Complaint   Patient presents with     Physical     and medications       Blood pressure 108/76, pulse 78, temperature 98.2  F (36.8  C), temperature source Skin, resp. rate 15, height 1.518 m (4' 11.75\"), weight 53.3 kg (117 lb 8 oz), last menstrual period 02/09/2022, SpO2 99 %, not currently breastfeeding. Body mass index is 23.14 kg/m .  Patient Active Problem List   Diagnosis     Attention deficit hyperactivity disorder (ADHD)     Raynaud's phenomenon without gangrene     Seasonal allergic rhinitis       Wt Readings from Last 2 Encounters:   03/02/22 53.3 kg (117 lb 8 oz)   02/15/21 54.4 kg (120 lb)     BP Readings from Last 3 Encounters:   03/02/22 108/76   02/15/21 104/71   02/07/20 102/69         Current Outpatient Medications   Medication     lisdexamfetamine (VYVANSE) 30 MG capsule     naproxen sodium (ANAPROX) 550 MG tablet     No current facility-administered medications for this visit.       Social History     Tobacco Use     Smoking status: Former Smoker     Packs/day: 1.00     Years: 2.00     Pack years: 2.00     Smokeless tobacco: Never Used   Substance Use Topics     Alcohol use: Yes     Comment: 1 x per week     Drug use: No       Health Maintenance Due   Topic Date Due     ADVANCE CARE PLANNING  Never done     HIV SCREENING  Never done     HEPATITIS C SCREENING  Never done     INFLUENZA VACCINE (1) 09/01/2021     ZOSTER IMMUNIZATION (1 of 2) 02/12/2022     PREVENTIVE CARE VISIT  02/15/2022     PAP  08/29/2022       Lab Results   Component Value Date    PAP negative for intraepithelial lesion 08/29/2019 March 2, 2022 9:59 AM    "

## 2022-03-02 NOTE — PROGRESS NOTES
"Nika Foreman presents to [facility] today to have an annual exam and receive refill of Vyvanse      ASSESSMENT/PLAN:    Annual Exam/Preventive Issues   -Check Lipids and diabetes screen  -Discussed shingles vaccine. She wants to wait as may be changing jobs.   -Declined influenza vaccines.   -For women's health issues, Nika follows with Gynecology    - Will check Vit D as history of low Vit D    -Specific concerns:     1. Refilled Vyvanse for 90 days. Start date 3/30   She will call in for a 90 day refill in 3 months  - Check urine drug screen today.     2. Fatigue  - Discussed checking for anemia, iron deficiency and also for TSH.   Nika will return to clinic for assessment of those    -Follow up: for discussion of fatigue    Dmitry Eisenberg MD, MS    Introduction: Nika is a 51 yo who I have met on one previous occasion, that being a year ago.   She was previously followed by Dr. Clay.     Doing well.   Vaccinated to COVID x3.     Takes Vit D as history of low.   Still, tired \"always.\"  No change.     Current Medications include:   Current Outpatient Medications   Medication Sig Dispense Refill     [START ON 3/30/2022] lisdexamfetamine (VYVANSE) 30 MG capsule Take 1 capsule (30 mg) by mouth every morning 90 capsule 0     naproxen sodium (ANAPROX) 550 MG tablet Take 1 tablet (550 mg) by mouth 2 times daily (with meals) 60 tablet 1     No Known Allergies      Social  Social History     Social History Narrative    Lives with . No children    Came to [place] in 2000 to work for [employer].     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     is a colon cancer survivor.        ROS  PHQ-2 Score:     PHQ-2 ( 1999 Pfizer) 3/2/2022 2/15/2021   Q1: Little interest or pleasure in doing things 0 0   Q2: Feeling down, depressed or hopeless 0 0   PHQ-2 Score 0 0   PHQ-2 Total Score (12-17 Years)- Positive if 3 or more points; " Administer PHQ-A if positive - 0       CONSTITUTIONAL:NEGATIVE for fever, chills, change in weight  INTEGUMENTARY/SKIN: NEGATIVE for worrisome rashes, moles or lesions  EYES: NEGATIVE for vision changes or irritation  ENT/MOUTH: NEGATIVE for ear, mouth and throat problems  RESP:NEGATIVE for significant cough or SOB  CV: NEGATIVE for chest pain, palpitations, MANUEL, orthopnea, PND  or peripheral edema  GI: NEGATIVE for nausea, abdominal pain, heartburn, or change in bowel habits  :NEGATIVE for frequency, dysuria, or hematuria  MUSCULOSKELETAL:NEGATIVE for significant arthralgias or myalgia  NEURO: NEGATIVE for weakness, dizziness or paresthesias  ENDOCRINE: NEGATIVE for polyuria/dipsia,  temperature intolerance, skin/hair changes  HEME/ALLERGY/IMMUNE: NEGATIVE for bleeding problems  PSYCHIATRIC: NEGATIVE for changes in mood or affect      Lifestyle and other preventive health:         Her physical activity - About 4 times/week at a gym.      Her diet - Is healthy.      Her sleep is occasionally interrupted.      Alcohol intake = about 3 nights/week. One drink/night  She does not.  use tobacco products.       Wears seat belt.--Yes.  Wears bike helmet--Yes. .       Her last dental check up was a few months ago.      Colorectal cancer screening - Had colonoscopy with Dr. Winston in July 2019    FEMALE Specific health issues  Still having menses. Now with more cramping.   Sees Dr. Siddiqui, a specialist who does manipulation that helps.   Also, sees Dr. Anthony, a Gynecologist.      Pap smears Aug., 2020.   Breast exams - Dense tissue  Last mammogram - May 2021. Gets annual with 3d imaging. Mom was a breast cancer survivor.   Contraception: None.   STD concerns: None        Health Maintenance   Topic Date Due     ADVANCE CARE PLANNING  Never done     HIV SCREENING  Never done     HEPATITIS C SCREENING  Never done     INFLUENZA VACCINE (1) 09/01/2021     ZOSTER IMMUNIZATION (1 of 2) 02/12/2022     PREVENTIVE CARE VISIT   "02/15/2022     PAP  08/29/2022     DTAP/TDAP/TD IMMUNIZATION (3 - Td or Tdap) 05/01/2022     LIPID  06/09/2022     COLORECTAL CANCER SCREENING  07/22/2029     PHQ-2  Completed     COVID-19 Vaccine  Completed     Pneumococcal Vaccine: Pediatrics (0 to 5 Years) and At-Risk Patients (6 to 64 Years)  Aged Out     IPV IMMUNIZATION  Aged Out     MENINGITIS IMMUNIZATION  Aged Out     HEPATITIS B IMMUNIZATION  Aged Out     MAMMO SCREENING  Discontinued         Patient Active Problem List   Diagnosis     Attention deficit hyperactivity disorder (ADHD)     Raynaud's phenomenon without gangrene     Seasonal allergic rhinitis       Past Surgical History:   Procedure Laterality Date     ADENOIDECTOMY       MANDIBLE SURGERY  2006     TONSILLECTOMY         Family History   Problem Relation Age of Onset     Breast Cancer Mother      Osteoporosis Maternal Grandmother      Breast Cancer Other         MGM's sister     Heart Disease No family hx of      Ovarian Cancer No family hx of      Colon Cancer No family hx of        Immunizations are as follows:      Immunization History   Administered Date(s) Administered     COVID-19,PF,Moderna 04/16/2021, 05/15/2021, 12/20/2021     HEPA 04/22/2003, 04/22/2004     TD (ADULT, 7+) 10/01/2002     Tdap (Adacel,Boostrix) 05/01/2012       EXAM    Vitals: /76 (BP Location: Right arm, Patient Position: Sitting, Cuff Size: Adult Regular)   Pulse 78   Temp 98.2  F (36.8  C) (Skin)   Resp 15   Ht 1.518 m (4' 11.75\")   Wt 53.3 kg (117 lb 8 oz)   LMP 02/09/2022 (Approximate)   SpO2 99%   BMI 23.14 kg/m    BMI= Body mass index is 23.14 kg/m .      GENERAL APPEARANCE: Appears well.   HENT: TM normal bilaterally. Neck supple  NECK: no adenopathy, thyroid normal to palpation   RESP: lungs clear to auscultation bilaterally.    CV: regular rate and rhythm, normal S1 S2, no murmur, no carotid bruits  ABDOMEN: soft, nontender, without HSM or masses. Bowel sounds normal   and Rectal exam: " deferred  MS: extremities normal- no gross deformities noted, no tender, hot or swollen joints.   SKIN: no suspicious lesions or rashes  NEURO: Normal strength and tone, sensory exam grossly normal  PSYCH: mentation appears normal. and affect normal/bright.  EXT: no peripheral edema      SEE TOP OF NOTE FOR ASSESSMENT AND PLAN    --Dmitry Eisenberg MD, MS  [facility], Family and Sports Medicine

## 2022-04-15 DIAGNOSIS — F90.0 ATTENTION DEFICIT HYPERACTIVITY DISORDER (ADHD), PREDOMINANTLY INATTENTIVE TYPE: ICD-10-CM

## 2022-04-15 RX ORDER — LISDEXAMFETAMINE DIMESYLATE 30 MG/1
30 CAPSULE ORAL EVERY MORNING
Qty: 90 CAPSULE | Refills: 0 | Status: SHIPPED | OUTPATIENT
Start: 2022-04-15 | End: 2022-07-20

## 2022-04-15 NOTE — TELEPHONE ENCOUNTER
Who is calling? Patient  Medication name: lisdexamfetamine (VYVANSE) 30 MG capsule  Is this a refill request? Yes  Have they contacted the pharmacy?  Yes  Pharmacy:     [facility] - [place], [place] - [address]  [address]  [address]  [facility] [address]  Phone: [phone] Fax: [phone]    Question/Concern: Patient recently moved the medication was lost. [facility] confirmed it by calling the Postal Service to make sure.   Would patient like a call back? No

## 2022-04-15 NOTE — TELEPHONE ENCOUNTER
Lisdexamfetamine (Vyvanse) 30 mg    Last Office Visit: 3/2/22  Future [facility] Appointments: None  Medication last refilled: 3/30/22 #90 with 0 refill(s)     verified - last fill date: 3/30/22 #90    Routing refill request to provider for review/approval because:  Drug not on the [facility] refill protocol     Patient claims due to her move, never received recent prescription that was mailed to her.  I called [facility] (her previous pharmacy where this prescription was sent from) and they did mail it to her on 3/30/22, but cannot confirm it arrived.    Paige Marti, BSN, RN, CCM

## 2022-05-16 ENCOUNTER — TELEPHONE (OUTPATIENT)
Dept: FAMILY MEDICINE | Facility: CLINIC | Age: 52
End: 2022-05-16

## 2022-05-16 NOTE — TELEPHONE ENCOUNTER
"90-day supply of Lisdexamfetamine mailed to Nika by [employer] was reported \"lost\" by the postal service.  A replacement shipment of 90-days was sent on 4/27/22 which she received.  We received notification on 5/13/22 from [employer] that on 4/22/22, the [place] Postal Service delivered the 3/30/22 \"lost\" 90-day supply on 4/22/22.  Nika has now been delivered a 6-month supply of her Lisdexamfetamine 30 mg.  Her next refill will not be due until October 22, 2022.  Patient sent MyChart of the above information. Dr. Eisenberg also notified.  JYOTI MoellerN, RN, [facility]  05/16/22  12:15 PM    "

## 2022-07-18 DIAGNOSIS — F90.0 ATTENTION DEFICIT HYPERACTIVITY DISORDER (ADHD), PREDOMINANTLY INATTENTIVE TYPE: ICD-10-CM

## 2022-07-19 NOTE — TELEPHONE ENCOUNTER
Medication requested: lisdexamfetamine (VYVANSE) 30 MG capsule  Last office visit: 3/2/22  [facility] appointments: none  Medication last refilled: 4/15/22; 90 + 0 refills, last sold 4/22/22 per   Last qualifying labs: N/A    Routing refill request to provider for review/approval because:  Drug not on the [facility] refill protocol     Lion GUERRERO, ARACELIS  07/19/22 6:06 PM

## 2022-07-20 RX ORDER — LISDEXAMFETAMINE DIMESYLATE 30 MG/1
30 CAPSULE ORAL EVERY MORNING
Qty: 90 CAPSULE | Refills: 0 | Status: SHIPPED | OUTPATIENT
Start: 2022-07-20 | End: 2022-12-21

## 2022-11-19 ENCOUNTER — HEALTH MAINTENANCE LETTER (OUTPATIENT)
Age: 52
End: 2022-11-19

## 2022-12-20 DIAGNOSIS — F90.0 ATTENTION DEFICIT HYPERACTIVITY DISORDER (ADHD), PREDOMINANTLY INATTENTIVE TYPE: ICD-10-CM

## 2022-12-21 RX ORDER — LISDEXAMFETAMINE DIMESYLATE 30 MG/1
30 CAPSULE ORAL EVERY MORNING
Qty: 90 CAPSULE | Refills: 0 | Status: SHIPPED | OUTPATIENT
Start: 2022-12-21 | End: 2023-04-17

## 2022-12-21 NOTE — CONFIDENTIAL NOTE
Lisdexamfetamine (Vyvanse) 30 mg    Last Office Visit: 3/2/22  Future [facility] Appointments: None  Medication last refilled: 7/20/22 #90 with 0 refill(s)     verified - last fill date: 7/20/22 #90    Routing refill request to provider for review/approval because:  Drug not on the [facility] refill protocol     CESAR Moeller, RN, CCM

## 2023-02-27 RX ORDER — PHENOLSULFONIC ACID AND SULFURIC ACID .5; .3 G/ML; G/ML
SOLUTION TOPICAL
COMMUNITY
Start: 2021-12-29

## 2023-02-27 RX ORDER — DIAZEPAM 5 MG
TABLET ORAL
COMMUNITY
Start: 2021-11-02

## 2023-03-01 NOTE — PROGRESS NOTES
"CC: Physical (Wants to establish with PCP. )      Nika is a 53 year old female who presents to clinic for an annual exam.       Chronic health conditions:  ADHD - Diagnosed about 10 years ago. On Vyvanse 30 mg daily. Works well. No insomnia, appetite suppression, HTN, palpitations. Can definitely tell when she doesn't take it.     We reviewed and updated her medication list as necessary. Her past medical and surgical history as well as her family history were reviewed and updated as necessary.    Gynecological history:  Menopausal symptoms: does have hot flashes and night sweats in the last 5 months -- no menses over the last 2 months  Last Pap:  2021, result Normal  History of abnormal Paps: yes - colposcopy in  CIN1 -- normal since then  Concern for STIs: no  Safety: Feels safe in relationship  Breast cancer screenin2022 - annual mammograms d/t FH of breast cancer in her mother    Other health-related habits:  Nutrition: Varied & balanced  Physical activity: 4-5 days/week  Tobacco: None - 2 pack year history    Alcohol: Rare  Drug use: no   Dental: Goes regularly - needs diazepam prior to dental procedures (prescribed by dentist)  Vaccines: will come back for shingles vaccine but will get tetanus today  DM screening: 3/2022 - glucose 95  Lipids: 3/2022 - ,   Colon cancer screenin2019 - follow-up in 10 years      OBJECTIVE:   /78 (BP Location: Right arm, Patient Position: Sitting, Cuff Size: Adult Regular)   Pulse 75   Temp 97.2  F (36.2  C) (Skin)   Resp 16   Ht 1.511 m (4' 11.5\")   Wt 53.9 kg (118 lb 12 oz)   LMP  (LMP Unknown)   SpO2 96%   BMI 23.58 kg/m     General: Healthy female in NAD.  Cooperative and pleasant.  HEENT: Normocephalic, atraumatic. Oropharynx moist without lesions or exudate.  TMs normal. Supple neck, without lymphadenopathy or thyromegaly.    Breasts: No obvious masses, axillary adenopathy or fibrocystic changes.    Lungs: CTA bilaterally.  CV: " RRR, normal S1 and S2, without murmurs, rubs, or gallops appreciated.    Abdomen: Soft, NT, ND.  No masses or hepatosplenomegaly appreciated.    Extremities: WWW, without deformity, edema.  Skin: Clear without lesions or rashes.   Neuro: Grossly intact, nonfocal.  Psych: Mood and affect appropriate.      ASSESSMENT AND PLAN:   Nika was seen today for physical.  Routine general medical examination at a health care facility  Health maintenance updates: Tdap today. Will RTC for shingles vaccine. Defer labs this year since unremarkable last year. Continue annual mammograms. Menopausal changes discussed.  integrative medicine handout on menopause provided and reviewed with patient.    Attention deficit hyperactivity disorder (ADHD), predominantly inattentive type  The current medical regimen is effective;  continue present plan and medications - Vyvanse 30 mg daily. Not due for refill at this time. New CSA signed today.     PDMP Review       Value Time User    State PDMP site checked  Yes 3/2/2023  8:13 AM Cathy Hollingsworth MD          Return in about 1 year (around 3/2/2024) for physical.    Cathy Hollingsworth MD  Family Medicine

## 2023-03-02 ENCOUNTER — OFFICE VISIT (OUTPATIENT)
Dept: FAMILY MEDICINE | Facility: CLINIC | Age: 53
End: 2023-03-02
Payer: COMMERCIAL

## 2023-03-02 VITALS
HEIGHT: 60 IN | OXYGEN SATURATION: 96 % | WEIGHT: 118.75 LBS | RESPIRATION RATE: 16 BRPM | HEART RATE: 75 BPM | DIASTOLIC BLOOD PRESSURE: 78 MMHG | SYSTOLIC BLOOD PRESSURE: 113 MMHG | BODY MASS INDEX: 23.31 KG/M2 | TEMPERATURE: 97.2 F

## 2023-03-02 DIAGNOSIS — F90.0 ATTENTION DEFICIT HYPERACTIVITY DISORDER (ADHD), PREDOMINANTLY INATTENTIVE TYPE: ICD-10-CM

## 2023-03-02 DIAGNOSIS — Z00.00 ROUTINE GENERAL MEDICAL EXAMINATION AT A HEALTH CARE FACILITY: Primary | ICD-10-CM

## 2023-03-02 DIAGNOSIS — Z23 NEED FOR VACCINATION: ICD-10-CM

## 2023-03-02 RX ORDER — MULTIVIT-MIN/IRON/FOLIC ACID/K 18-600-40
5000 CAPSULE ORAL WEEKLY
COMMUNITY

## 2023-03-02 ASSESSMENT — ENCOUNTER SYMPTOMS
DYSURIA: 0
SORE THROAT: 0
CONSTIPATION: 0
DIZZINESS: 0
BREAST MASS: 0
PALPITATIONS: 0
HEADACHES: 0
FEVER: 0
FREQUENCY: 0
HEARTBURN: 0
SHORTNESS OF BREATH: 0
EYE PAIN: 0
DIARRHEA: 0
HEMATOCHEZIA: 0
ABDOMINAL PAIN: 0
HEMATURIA: 0
ARTHRALGIAS: 0
CHILLS: 0
NERVOUS/ANXIOUS: 0
COUGH: 0
MYALGIAS: 0
WEAKNESS: 0
JOINT SWELLING: 0
NAUSEA: 0
PARESTHESIAS: 0

## 2023-03-02 NOTE — LETTER
[facility]  03/02/23  Patient: Nika Foreman  YOB: 1970  Medical Record Number: [id number]                                                                                  Non-Opioid Controlled Substance Agreement    This is an agreement between you and your provider regarding safe and appropriate use of controlled substances prescribed by your care team. Controlled substances are?medicines that can cause physical and mental dependence (abuse).     There are strict laws about having and using these medicines. We here at [facility] are  committed to working with you in your efforts to get better. To support you in this work, we'll help you schedule regular office appointments for medicine refills. If we must cancel or change your appointment for any reason, we'll make sure you have enough medicine to last until your next appointment.     As a Provider, I will:     Listen carefully to your concerns while treating you with respect.     Recommend a treatment plan that I believe is in your best interest and may involve therapies other than medicine.      Talk with you often about the possible benefits and the risk of harm of any medicine that we prescribe for you.    Assess the safety of this medicine and check how well it works.      Provide a plan on how to taper (discontinue or go off) using this medicine if the decision is made to stop its use.      ::  As a Patient, I understand controlled substances:       Are prescribed by my care provider to help me function or work and manage my condition(s).?    Are strong medicines and can cause serious side effects.       Need to be taken exactly as prescribed.?Combining controlled substances with certain medicines or chemicals (such as illegal drugs, alcohol, sedatives, sleeping pills, and benzodiazepines) can be dangerous or even fatal.? If I stop taking my medicines suddenly, I may have severe withdrawal symptoms.     The risks,  benefits, and side effects of these medicine(s) were explained to me. I agree that:    1. I will take part in other treatments as advised by my care team. This may be psychiatry or counseling, physical therapy, behavioral therapy, group treatment or a referral to specialist.    2. I will keep all my appointments and understand this is part of the monitoring of controlled substances.?My care team may require an office visit for EVERY controlled substance refill. If I miss appointments or don t follow instructions, my care team may stop my medicine    3. I will take my medicines as prescribed. I will not change the dose or schedule unless my care team tells me to. There will be no refills if I run out early.      4. I may be asked to come to the clinic and complete a urine drug test or complete a pill count. If I don t give a urine sample or participate in a pill count, the care team may stop my medicine.    5. I will only receive controlled substance prescriptions from this clinic. If I am treated by another provider, I will tell them that I am taking controlled substances and that I have a treatment agreement with this provider. I will inform my [facility] care team within one business day if I am given a prescription for any controlled substance by another healthcare provider. My [facility] care team can contact other providers and pharmacists about my use of any medicines.    6. It is up to me to make sure that I don't run out of my medicines on weekends or holidays.?If my care team is willing to refill my prescription without a visit, I must request refills only during office hours. Refills may take up to 3 business days to process. I will use one pharmacy to fill all my controlled substance prescriptions. I will notify the clinic about any changes to my insurance or medicine availability.    7. I am responsible for my prescriptions. If the medicine/prescription is lost, stolen or destroyed, it will  not be replaced.?I also agree not to share controlled substance medicines with anyone.     8. I am aware I should not use any illegal or recreational drugs. I agree not to drink alcohol unless my care team says I can.     9. If I enroll in the [place] Medical Cannabis program, I will tell my care team before my next refill.    10. I will tell my care team right away if I become pregnant, have a new medical problem treated outside of my regular clinic, or have a change in my medicines.     11. I understand that this medicine can affect my thinking, judgment and reaction time.? Alcohol and drugs affect the brain and body, which can affect the safety of my driving. Being under the influence of alcohol or drugs can affect my decision-making, behaviors, personal safety and the safety of others. Driving while impaired (DWI) can occur if a person is driving, operating or in physical control of a car, motorcycle, boat, snowmobile, ATV, motorbike, off-road vehicle or any other motor vehicle ([place] Statute 169A.20). I understand the risk if I choose to drive or operate any vehicle or machinery.    I understand that if I do not follow any of the conditions above, my prescriptions or treatment may be stopped or changed.   I agree that my provider, clinic care team and pharmacy may work with any city, state or federal law enforcement agency that investigates the misuse, sale or other diversion of my controlled medicine. I will allow my provider to discuss my care with, or share a copy of, this agreement with any other treating provider, pharmacy or emergency room where I receive care.     I have read this agreement and have asked questions about anything I did not understand.    ________________________________________________________  Patient Signature - Nika Foreman     ___________________                   Date     ________________________________________________________  Provider Signature - Cathy Hollingsworth MD        ___________________                   Date     ________________________________________________________  Witness Signature (required if provider not present while patient signing)          ___________________                   Date

## 2023-03-02 NOTE — NURSING NOTE
"53 year old  Chief Complaint   Patient presents with     Physical     Wants to establish with PCP.        Blood pressure 113/78, pulse 75, temperature 97.2  F (36.2  C), temperature source Skin, resp. rate 16, height 1.511 m (4' 11.5\"), weight 53.9 kg (118 lb 12 oz), SpO2 (!) 16 %, not currently breastfeeding. Body mass index is 23.58 kg/m .  Patient Active Problem List   Diagnosis     Attention deficit hyperactivity disorder (ADHD)     Raynaud's phenomenon without gangrene     Seasonal allergic rhinitis       Wt Readings from Last 2 Encounters:   03/02/23 53.9 kg (118 lb 12 oz)   03/02/22 53.3 kg (117 lb 8 oz)     BP Readings from Last 3 Encounters:   03/02/23 113/78   03/02/22 108/76   02/15/21 104/71         Current Outpatient Medications   Medication     diazepam (VALIUM) 5 MG tablet     lisdexamfetamine (VYVANSE) 30 MG capsule     naproxen sodium (ANAPROX) 550 MG tablet     Sulfuric Acid-Sulf Phenolics (DEBACTEROL) 30-50 % Misc     No current facility-administered medications for this visit.       Social History     Tobacco Use     Smoking status: Former     Packs/day: 1.00     Years: 2.00     Pack years: 2.00     Types: Cigarettes     Smokeless tobacco: Never   Vaping Use     Vaping Use: Never used   Substance Use Topics     Alcohol use: Yes     Comment: 1 x per week     Drug use: No       Health Maintenance Due   Topic Date Due     ADVANCE CARE PLANNING  Never done     HEPATITIS B IMMUNIZATION (1 of 3 - 3-dose series) Never done     HIV SCREENING  Never done     HEPATITIS C SCREENING  Never done     ZOSTER IMMUNIZATION (1 of 2) Never done     DTAP/TDAP/TD IMMUNIZATION (3 - Td or Tdap) 05/01/2022     INFLUENZA VACCINE (1) 09/01/2022       Lab Results   Component Value Date    PAP negative for intraepithelial lesion 08/29/2019 March 2, 2023 8:27 AM    "

## 2023-03-02 NOTE — NURSING NOTE
Prior to immunization administration, verified patients identity using patient s name and date of birth. Please see Immunization Activity for additional information.     Screening Questionnaire for Adult Immunization    Are you sick today?   No   Do you have allergies to medications, food, a vaccine component or latex?   No   Have you ever had a serious reaction after receiving a vaccination?   No   Do you have a long-term health problem with heart, lung, kidney, or metabolic disease (e.g., diabetes), asthma, a blood disorder, no spleen, complement component deficiency, a cochlear implant, or a spinal fluid leak?  Are you on long-term aspirin therapy?   No   Do you have cancer, leukemia, HIV/AIDS, or any other immune system problem?   No   Do you have a parent, brother, or sister with an immune system problem?   No   In the past 3 months, have you taken medications that affect  your immune system, such as prednisone, other steroids, or anticancer drugs; drugs for the treatment of rheumatoid arthritis, Crohn s disease, or psoriasis; or have you had radiation treatments?   No   Have you had a seizure, or a brain or other nervous system problem?   No   During the past year, have you received a transfusion of blood or blood    products, or been given immune (gamma) globulin or antiviral drug?   No   For women: Are you pregnant or is there a chance you could become       pregnant during the next month?   No   Have you received any vaccinations in the past 4 weeks?   No     Immunization questionnaire answers were all negative.        Per orders of Dr. Hollingsworth, injection of TDAP given by Kayy Galo LPN. Patient instructed to remain in clinic for 15 minutes afterwards, and to report any adverse reaction to me immediately.       Screening performed by Kayy Galo LPN on 3/2/2023 at 9:15 AM.

## 2023-04-17 DIAGNOSIS — F90.0 ATTENTION DEFICIT HYPERACTIVITY DISORDER (ADHD), PREDOMINANTLY INATTENTIVE TYPE: ICD-10-CM

## 2023-04-17 RX ORDER — LISDEXAMFETAMINE DIMESYLATE 30 MG/1
30 CAPSULE ORAL EVERY MORNING
Qty: 90 CAPSULE | Refills: 0 | Status: SHIPPED | OUTPATIENT
Start: 2023-04-17 | End: 2023-08-01

## 2023-04-17 NOTE — TELEPHONE ENCOUNTER
Medication requested: lisdexamfetamine (VYVANSE) 30 MG capsule  Last office visit: 3/2/23  [facility] appointments: none  Medication last refilled: 12/21/22; 90 + 0 refills, last sold 12/23/22  Last qualifying labs: N/A    Routing refill request to provider for review/approval because:  Drug not on the [facility] refill protocol     Lion GUERRERO, RN  04/17/23 12:42 PM

## 2023-08-01 DIAGNOSIS — F90.0 ATTENTION DEFICIT HYPERACTIVITY DISORDER (ADHD), PREDOMINANTLY INATTENTIVE TYPE: ICD-10-CM

## 2023-08-01 RX ORDER — LISDEXAMFETAMINE DIMESYLATE 30 MG/1
30 CAPSULE ORAL EVERY MORNING
Qty: 90 CAPSULE | Refills: 0 | Status: SHIPPED | OUTPATIENT
Start: 2023-08-01 | End: 2023-11-09

## 2023-08-01 NOTE — TELEPHONE ENCOUNTER
Medication requested: lisdexamfetamine (VYVANSE) 30 MG capsule   Last office visit: 3/2/23  [facility] appointments: none  Medication last refilled: 4/17/23; 90 + 0 refills, last sold 5/2/23 per   Last qualifying labs: N/A    Routing refill request to provider for review/approval because:  Drug not on the [facility] refill protocol     Lion GUERRERO, RN  08/01/23 12:07 PM

## 2023-09-07 ENCOUNTER — ALLIED HEALTH/NURSE VISIT (OUTPATIENT)
Dept: FAMILY MEDICINE | Facility: CLINIC | Age: 53
End: 2023-09-07
Payer: COMMERCIAL

## 2023-09-07 DIAGNOSIS — Z23 NEED FOR SHINGLES VACCINE: Primary | ICD-10-CM

## 2023-09-07 NOTE — PROGRESS NOTES
Prior to immunization administration, verified patients identity using patient s name and date of birth. Please see Immunization Activity for additional information.     Screening Questionnaire for Adult Immunization    Are you sick today?   No   Do you have allergies to medications, food, a vaccine component or latex?   No   Have you ever had a serious reaction after receiving a vaccination?   No   Do you have a long-term health problem with heart, lung, kidney, or metabolic disease (e.g., diabetes), asthma, a blood disorder, no spleen, complement component deficiency, a cochlear implant, or a spinal fluid leak?  Are you on long-term aspirin therapy?   No   Do you have cancer, leukemia, HIV/AIDS, or any other immune system problem?   No   Do you have a parent, brother, or sister with an immune system problem?   No   In the past 3 months, have you taken medications that affect  your immune system, such as prednisone, other steroids, or anticancer drugs; drugs for the treatment of rheumatoid arthritis, Crohn s disease, or psoriasis; or have you had radiation treatments?   No   Have you had a seizure, or a brain or other nervous system problem?   No   During the past year, have you received a transfusion of blood or blood    products, or been given immune (gamma) globulin or antiviral drug?   No   For women: Are you pregnant or is there a chance you could become       pregnant during the next month?   No   Have you received any vaccinations in the past 4 weeks?   No     Immunization questionnaire answers were all negative.    I have reviewed the following standing orders:   This patient is due and qualifies for the Zoster vaccine.    Click here for Zoster Standing Order    I have reviewed the vaccines inclusion and exclusion criteria; No concerns regarding eligibility.     Patient instructed to remain in clinic for 15 minutes afterwards, and to report any adverse reactions.     Screening performed by Cassie Ibarra CMA on  9/7/2023 at 9:23 AM.      Nika Foreman comes into clinic today at the request of Dr. Hollingsworth Ordering Provider for Shingrix.      This service provided today was under the supervising provider of the day Dr. Montano, who was available if needed.    Cassie Ibarra CMA

## 2023-11-09 DIAGNOSIS — F90.0 ATTENTION DEFICIT HYPERACTIVITY DISORDER (ADHD), PREDOMINANTLY INATTENTIVE TYPE: ICD-10-CM

## 2023-11-09 NOTE — TELEPHONE ENCOUNTER
Lisdexamfetamine (Vyvanse) 30 mg    Last Office Visit: 3/2/23  [place] [facility] [place]: [place]28[place]23  Medication last refilled: 8/1/23 #90 with 0 refill(s)     verified - last fill date: 8/5/23 #90    Routing refill request to provider for review/approval because:  Drug not on the [facility] refill protocol     JYOTI MoellerN, RN, CCM

## 2023-11-10 RX ORDER — LISDEXAMFETAMINE DIMESYLATE 30 MG/1
30 CAPSULE ORAL EVERY MORNING
Qty: 90 CAPSULE | Refills: 0 | Status: SHIPPED | OUTPATIENT
Start: 2023-11-10 | End: 2023-11-13

## 2023-11-10 NOTE — TELEPHONE ENCOUNTER
Prescription refilled and sent to pharmacy.    PDMP Review         Value Time User    State PDMP site checked  Yes 11/10/2023  7:46 AM Cathy Hollingsworth MD

## 2023-11-13 ENCOUNTER — MYC MEDICAL ADVICE (OUTPATIENT)
Dept: FAMILY MEDICINE | Facility: CLINIC | Age: 53
End: 2023-11-13

## 2023-11-13 DIAGNOSIS — F90.0 ATTENTION DEFICIT HYPERACTIVITY DISORDER (ADHD), PREDOMINANTLY INATTENTIVE TYPE: ICD-10-CM

## 2023-11-13 RX ORDER — LISDEXAMFETAMINE DIMESYLATE 30 MG/1
30 CAPSULE ORAL EVERY MORNING
Qty: 30 CAPSULE | Refills: 0 | Status: SHIPPED | OUTPATIENT
Start: 2023-11-13 | End: 2023-11-29

## 2023-11-16 ENCOUNTER — TELEPHONE (OUTPATIENT)
Dept: FAMILY MEDICINE | Facility: CLINIC | Age: 53
End: 2023-11-16

## 2023-11-16 NOTE — TELEPHONE ENCOUNTER
Pt is unable to get vyvanse at any pharmacy.     Requesting Dr. Hollingsworth to prescribe something similar due to out of stock.    Pt would like call back from ARACELIS Mata

## 2023-11-22 ENCOUNTER — TELEPHONE (OUTPATIENT)
Dept: FAMILY MEDICINE | Facility: CLINIC | Age: 53
End: 2023-11-22

## 2023-11-22 DIAGNOSIS — F90.0 ATTENTION DEFICIT HYPERACTIVITY DISORDER (ADHD), PREDOMINANTLY INATTENTIVE TYPE: ICD-10-CM

## 2023-11-22 RX ORDER — LISDEXAMFETAMINE DIMESYLATE 30 MG/1
30 CAPSULE ORAL EVERY MORNING
Qty: 90 CAPSULE | Refills: 0 | Status: CANCELLED | OUTPATIENT
Start: 2023-11-22

## 2023-11-22 NOTE — TELEPHONE ENCOUNTER
Who is calling? Patient   Reason for Call:Patient is requesting a call back from a RN. Patient is unable to find a pharmacy in stock with Vyvanse. Patient would like to go back to mail order w/ 90 days

## 2023-11-22 NOTE — TELEPHONE ENCOUNTER
Medication requested: lisdexamfetamine (VYVANSE) 30 MG capsule   Last office visit: 3/2/23  [facility] appointments: 11/29/23  Medication last refilled: 11/13/23; 30 + 0 refills, last sold 8/5/23 per   Last qualifying labs: N/A    Routing refill request to provider for review/approval because:  Drug not on the [facility] refill protocol     Pt requesting previous refill be canceled at [facility] pharmacy and instead be sent back to [facility] mail order pharmacy.  Lion GUERRERO, RN  11/22/23 1:52 PM

## 2023-11-28 RX ORDER — TRIAMCINOLONE ACETONIDE 1 MG/G
OINTMENT TOPICAL
COMMUNITY
Start: 2023-08-20

## 2023-11-29 ENCOUNTER — VIRTUAL VISIT (OUTPATIENT)
Dept: FAMILY MEDICINE | Facility: CLINIC | Age: 53
End: 2023-11-29
Payer: COMMERCIAL

## 2023-11-29 DIAGNOSIS — F90.0 ATTENTION DEFICIT HYPERACTIVITY DISORDER (ADHD), PREDOMINANTLY INATTENTIVE TYPE: Primary | ICD-10-CM

## 2023-11-29 RX ORDER — LISDEXAMFETAMINE DIMESYLATE 30 MG/1
30 CAPSULE ORAL EVERY MORNING
Qty: 90 CAPSULE | Refills: 0 | Status: SHIPPED | OUTPATIENT
Start: 2023-11-29 | End: 2024-02-02

## 2023-11-29 NOTE — PROGRESS NOTES
Nika is a 53 year old who is being evaluated via a billable video visit.      How would you like to obtain your AVS? MyChart  If the video visit is dropped, the invitation should be resent by: Text to cell phone: [phone]  Will anyone else be joining your video visit? No    Assessment & Plan     Attention deficit hyperactivity disorder (ADHD), predominantly inattentive type  Chronic, not currently controlled b/c she is unable to get her medication. Discussed due to medication shortage, she can call different pharmacies to see where it's available, but Nika would prefer not to do this and to continue to use her mail order pharmacy. Also discussed option to take lower dose of Vyanse (20 mg) daily if that's available (this would be a temporary dose decrease while waiting for 30 mg to be available). It may not be as effecting as 30 mg daily, but would potentially help a bit. We also discussed switching to a different stimulant, but she would prefer not to do this. She knows Vyvanse works well for her when it's available, so she'd like to just continue this. Prescribed medication to mail order pharmacy to see if she can get this filled, and she will call and ask about availability of the 20 mg dose in the meantime.    - lisdexamfetamine (VYVANSE) 30 MG capsule; Take 1 capsule (30 mg) by mouth every morning    PDMP Review         Value Time User    State PDMP site checked  Yes 11/29/2023  1:15 PM Cathy Hollingsworth MD            Return in about 3 months (around 2/29/2024) for physical.    Cathy Hollingsworth MD   [facility]   Nika is a 53 year old, presenting for the following health issues:  A.D.H.D (Discuss possible changing to a different medication)    HPI   ADHD - dx 10 years ago. Vyvanse is the only ADHD medication she has tried. She is on 30 mg daily. D/t med shortage, she has not been able to get this filled. She is having a hard time focusing without it. Getting tired really  easily without the medication. Brain fog. Wondering abou tother options for med management.               Objective       Vitals:  No vitals were obtained today due to virtual visit.    Physical Exam   GENERAL: Healthy, alert and no distress  EYES: Eyes grossly normal to inspection.  No discharge or erythema, or obvious scleral/conjunctival abnormalities.  RESP: No audible wheeze, cough, or visible cyanosis.  No visible retractions or increased work of breathing.    SKIN: Visible skin clear. No significant rash, abnormal pigmentation or lesions.  NEURO: Cranial nerves grossly intact.  Mentation and speech appropriate for age.  PSYCH: Mentation appears normal, affect normal/bright, judgement and insight intact, normal speech and appearance well-groomed.          Video-Visit Details    Type of service:  Video Visit   Originating Location (pt. Location): Home  Distant Location (provider location):  On-site  Platform used for Video Visit: Simran

## 2023-12-29 ENCOUNTER — ALLIED HEALTH/NURSE VISIT (OUTPATIENT)
Dept: FAMILY MEDICINE | Facility: CLINIC | Age: 53
End: 2023-12-29
Payer: COMMERCIAL

## 2023-12-29 DIAGNOSIS — Z23 NEED FOR SHINGLES VACCINE: Primary | ICD-10-CM

## 2023-12-29 NOTE — PROGRESS NOTES
Nika Foreman comes into the clinic for a vaccination(s): Shingles, as advised by their PCP, Dr. Cathy Hollingsworth  The service provided today was under the supervising provider, Dr. Luis Colin, who was available if needed.    Patient was instructed to wait in clinic for 15 minutes to monitor for adverse effects.    Cynthia Martinez LPN  1:28 PM December 29, 2023    Prior to immunization administration, verified patients identity using patient s name and date of birth. Please see Immunization Activity for additional information.     Screening Questionnaire for Adult Immunization    Are you sick today?   No   Do you have allergies to medications, food, a vaccine component or latex?   No   Have you ever had a serious reaction after receiving a vaccination?   No   Do you have a long-term health problem with heart, lung, kidney, or metabolic disease (e.g., diabetes), asthma, a blood disorder, no spleen, complement component deficiency, a cochlear implant, or a spinal fluid leak?  Are you on long-term aspirin therapy?   No   Do you have cancer, leukemia, HIV/AIDS, or any other immune system problem?   No   Do you have a parent, brother, or sister with an immune system problem?   No   In the past 3 months, have you taken medications that affect  your immune system, such as prednisone, other steroids, or anticancer drugs; drugs for the treatment of rheumatoid arthritis, Crohn s disease, or psoriasis; or have you had radiation treatments?   No   Have you had a seizure, or a brain or other nervous system problem?   No   During the past year, have you received a transfusion of blood or blood    products, or been given immune (gamma) globulin or antiviral drug?   No   For women: Are you pregnant or is there a chance you could become       pregnant during the next month?   No   Have you received any vaccinations in the past 4 weeks?   No     Immunization questionnaire answers were all negative.    I have reviewed the following  standing orders:   This patient is due and qualifies for the Zoster vaccine.    Click here for Zoster Standing Order    I have reviewed the vaccines inclusion and exclusion criteria; No concerns regarding eligibility.     Patient instructed to remain in clinic for 15 minutes afterwards, and to report any adverse reactions.     Screening performed by Cynthia Martinez LPN on 12/29/2023 at 1:28 PM.

## 2024-02-02 DIAGNOSIS — F90.0 ATTENTION DEFICIT HYPERACTIVITY DISORDER (ADHD), PREDOMINANTLY INATTENTIVE TYPE: ICD-10-CM

## 2024-02-05 NOTE — TELEPHONE ENCOUNTER
Medication requested: lisdexamfetamine (VYVANSE) 30 MG capsule   Last office visit: 3/2/23  [facility] appointments: 3/7/24  Medication last refilled: 11/29/23; 90 + 0 refills, last sold 11/29/23 per   Last qualifying labs: N/A    Routing refill request to provider for review/approval because:  Drug not on the [facility] refill protocol     Lion GUERRERO, RN  02/05/24 9:08 AM

## 2024-02-23 RX ORDER — LISDEXAMFETAMINE DIMESYLATE 30 MG/1
30 CAPSULE ORAL EVERY MORNING
Qty: 90 CAPSULE | Refills: 0 | Status: SHIPPED | OUTPATIENT
Start: 2024-02-23 | End: 2024-03-18

## 2024-02-23 NOTE — TELEPHONE ENCOUNTER
Chart & PDMP reviewed, appropriate for refills. Sent 90 day supply to pharmacy. Patient has an appt coming up on 3/7.

## 2024-03-13 ENCOUNTER — TELEPHONE (OUTPATIENT)
Dept: FAMILY MEDICINE | Facility: CLINIC | Age: 54
End: 2024-03-13

## 2024-03-13 NOTE — TELEPHONE ENCOUNTER
"Nika called upset claiming her mail order pharmacy hasn't sent her the Vyvanse yet and she's been out of medication now for \"3 weeks\".  I asked her why she's waited this long to call us if she's been out of her medication that long?  She said, \"I've been travelling\".  She then said, \"Did you all drop the ball and not send my prescription on time\"?  I replied, I'm sorry you feel that way as we've always sent your prescriptions on time as we did this time again.  It was sent on 2/23/24 @ 3:15 pm and it was confirmed your pharmacy received it.  It is very likely that your pharmacy does not have it in stock and why they have not sent it yet.  I strongly encouraged her also to find another pharmacy as her mail order pharmacy has not had this medication in stock since before November.  I instructed Nika to contact her pharmacy to inquire on the status of her prescription.  She said, I'm traveling and don't have their phone number.  I gave Nika the phone number to call them.  Paige Marti, JYOTIN, RN, CCM  RN Care Coordinator  [facility]  03/13/24  11:16 AM  Phone: [phone]    "

## 2024-03-15 ENCOUNTER — TELEPHONE (OUTPATIENT)
Dept: FAMILY MEDICINE | Facility: CLINIC | Age: 54
End: 2024-03-15

## 2024-03-15 DIAGNOSIS — F90.0 ATTENTION DEFICIT HYPERACTIVITY DISORDER (ADHD), PREDOMINANTLY INATTENTIVE TYPE: ICD-10-CM

## 2024-03-15 NOTE — TELEPHONE ENCOUNTER
Prior Authorization Not Needed per Insurance  Pharmacy has a paid claim however co-pay is 635.00 for #90/90 Days Supply  300.00 of that is going to their deductible.  They have not sent this out yet, as they are waiting to contact the patient about the co-pay amount before they send it.    Medication: VYVANSE 30 MG PO CAPS  Insurance Company: Comment:  En  Expected CoPay: $    Pharmacy Filling the Rx: [facility] - [address]  Pharmacy Notified: Yes  Patient Notified: Pharmacy will notify patient.

## 2024-03-15 NOTE — TELEPHONE ENCOUNTER
Prior Authorization Retail Medication Request    Medication/Dose: Lisdexamfetamine (VYVANSE) 30 MG  Diagnosis and ICD code (if different than what is on RX):  Same  New/renewal/insurance change PA/secondary ins. PA:  Previously Tried and Failed:  Same  Rationale:  Same    Insurance   Primary: [facility]  Insurance ID:  Same    Secondary (if applicable):Same  Insurance ID:  Same    Pharmacy Information (if different than what is on RX)  Name:  Same  Phone:  Same  Fax:Same    THIS IS AN URGENT PA.  Nika has been out of medication for 2 weeks not as her pharmacy has not told her why it hasn't been filled and just now as I went to resend the Rx, it says it needs a PA.    JYOTI MoellerN, RN, CCM  RN Care Coordinator  [facility]  03/15/24  3:57 PM  Phone: [phone]

## 2024-03-18 DIAGNOSIS — F90.0 ATTENTION DEFICIT HYPERACTIVITY DISORDER (ADHD), PREDOMINANTLY INATTENTIVE TYPE: ICD-10-CM

## 2024-03-18 RX ORDER — LISDEXAMFETAMINE DIMESYLATE 30 MG/1
30 CAPSULE ORAL EVERY MORNING
Qty: 30 CAPSULE | Refills: 0 | Status: SHIPPED | OUTPATIENT
Start: 2024-03-18 | End: 2024-03-20

## 2024-03-18 NOTE — TELEPHONE ENCOUNTER
Chart & PDMP reviewed, appropriate for refills. Sent 30 day supply to pharmacy. Patient has upcoming appointment on 3/21.

## 2024-03-18 NOTE — TELEPHONE ENCOUNTER
Mail order pharmacy is out of stock.  Requesting to send to local pharmacy for 30-day supply.  CESAR Gibson, RN, CCM

## 2024-03-20 ENCOUNTER — TELEPHONE (OUTPATIENT)
Dept: FAMILY MEDICINE | Facility: CLINIC | Age: 54
End: 2024-03-20

## 2024-03-20 DIAGNOSIS — F90.0 ATTENTION DEFICIT HYPERACTIVITY DISORDER (ADHD), PREDOMINANTLY INATTENTIVE TYPE: Primary | ICD-10-CM

## 2024-03-20 RX ORDER — LISDEXAMFETAMINE DIMESYLATE 30 MG/1
30 CAPSULE ORAL EVERY MORNING
Qty: 90 CAPSULE | Refills: 0 | Status: SHIPPED | OUTPATIENT
Start: 2024-03-20 | End: 2024-06-28

## 2024-03-20 NOTE — TELEPHONE ENCOUNTER
Nika called on a 3-way call with the pharmacist from [facility] mail order pharmacy and they have been out of stock of the generic version of her Vyvanse for months.  They do, however, have the brand name in stock.  They have cancelled her generic prescriptions and are asking for a new Rx for the brand name Vyvanse 30 mg.  Will send request to Dr. Hollingsworth.  JYOTI MoellerN, RN, [facility]  RN Care Coordinator  [facility]  03/20/24  1:59 PM  Phone: [phone]

## 2024-04-07 ENCOUNTER — HEALTH MAINTENANCE LETTER (OUTPATIENT)
Age: 54
End: 2024-04-07

## 2024-05-02 ENCOUNTER — OFFICE VISIT (OUTPATIENT)
Dept: FAMILY MEDICINE | Facility: CLINIC | Age: 54
End: 2024-05-02
Payer: COMMERCIAL

## 2024-05-02 VITALS
BODY MASS INDEX: 23.76 KG/M2 | DIASTOLIC BLOOD PRESSURE: 74 MMHG | HEIGHT: 60 IN | WEIGHT: 121.04 LBS | OXYGEN SATURATION: 100 % | SYSTOLIC BLOOD PRESSURE: 104 MMHG | HEART RATE: 75 BPM | TEMPERATURE: 97.3 F

## 2024-05-02 DIAGNOSIS — F90.0 ATTENTION DEFICIT HYPERACTIVITY DISORDER (ADHD), PREDOMINANTLY INATTENTIVE TYPE: ICD-10-CM

## 2024-05-02 DIAGNOSIS — Z00.00 ROUTINE GENERAL MEDICAL EXAMINATION AT A HEALTH CARE FACILITY: Primary | ICD-10-CM

## 2024-05-02 DIAGNOSIS — E55.9 VITAMIN D DEFICIENCY: ICD-10-CM

## 2024-05-02 SDOH — HEALTH STABILITY: PHYSICAL HEALTH: ON AVERAGE, HOW MANY MINUTES DO YOU ENGAGE IN EXERCISE AT THIS LEVEL?: 50 MIN

## 2024-05-02 SDOH — HEALTH STABILITY: PHYSICAL HEALTH: ON AVERAGE, HOW MANY DAYS PER WEEK DO YOU ENGAGE IN MODERATE TO STRENUOUS EXERCISE (LIKE A BRISK WALK)?: 6 DAYS

## 2024-05-02 ASSESSMENT — SOCIAL DETERMINANTS OF HEALTH (SDOH): HOW OFTEN DO YOU GET TOGETHER WITH FRIENDS OR RELATIVES?: TWICE A WEEK

## 2024-05-02 NOTE — PROGRESS NOTES
Preventive Care Visit  [facility]  Cathy Hollingsworth MD, Family Medicine  May 2, 2024      Assessment & Plan     Routine general medical examination at a health care facility  Will plan on screening labs next year.   She will call to schedule mammogram in June.     Attention deficit hyperactivity disorder (ADHD), predominantly inattentive type  The current medical regimen is effective;  continue present plan and medications. Currently taking Vyvanse 30 mg daily.     Vitamin D deficiency  Last vitamin D level slightly low at 28 on 5000 units weekly. Advised to increase to 7000 units weekly. Recheck with labs next year.       Counseling  Appropriate preventive services were discussed with this patient, including applicable screening as appropriate for fall prevention, nutrition, physical activity, Tobacco-use cessation, weight loss and cognition.  Checklist reviewing preventive services available has been given to the patient.  Reviewed patient's diet, addressing concerns and/or questions.       Return in about 53 weeks (around 5/8/2025) for Annual Wellness Visit.    Oneil Maher is a 54 year old, presenting for the following:  Physical         HPI        5/2/2024   General Health   How would you rate your overall physical health? Excellent   Feel stress (tense, anxious, or unable to sleep) To some extent   (!) STRESS CONCERN      5/2/2024   Nutrition   Three or more servings of calcium each day? (!) I DON'T KNOW   Diet: Regular (no restrictions)   How many servings of fruit and vegetables per day? (!) I DON'T KNOW   How many sweetened beverages each day? 0-1         5/2/2024   Exercise   Days per week of moderate/strenous exercise 6 days   Average minutes spent exercising at this level 50 min         5/2/2024   Social Factors   Frequency of gathering with friends or relatives Twice a week   Worry food won't last until get money to buy more No   Food not last or not have enough money for food? No   Do you  have housing?  No   Are you worried about losing your housing? No   Lack of transportation? No   Unable to get utilities (heat,electricity)? No   Want help with housing or utility concern? No   (!) HOUSING CONCERN PRESENT      5/2/2024   Fall Risk   Fallen 2 or more times in the past year? No   Trouble with walking or balance? No          5/2/2024   Dental   Dentist two times every year? Yes         5/2/2024   TB Screening   Were you born outside of the [place]? No         Today's PHQ-2 Score:       5/2/2024     9:17 AM   PHQ-2 ( 1999 [employer])   Q1: Little interest or pleasure in doing things 0   Q2: Feeling down, depressed or hopeless 0   PHQ-2 Score 0   Q1: Little interest or pleasure in doing things Not at all   Q2: Feeling down, depressed or hopeless Not at all   PHQ-2 Score 0           5/2/2024   Substance Use   Alcohol more than 3/day or more than 7/wk No   Do you use any other substances recreationally? No     Social History     Tobacco Use    Smoking status: Former     Current packs/day: 1.00     Average packs/day: 1 pack/day for 2.0 years (2.0 ttl pk-yrs)     Types: Cigarettes    Smokeless tobacco: Never   Vaping Use    Vaping status: Never Used   Substance Use Topics    Alcohol use: Yes     Comment: 1 x per week    Drug use: No      Mammogram Screening - Mammogram every 1 years updated in Health Maintenance based on mutual decision making - plans to schedule mammogram in June when she is due         5/2/2024   One time HIV Screening   Previous HIV test? I don't know         5/2/2024   STI Screening   New sexual partner(s) since last STI/HIV test? No     History of abnormal Pap smear: colposcopy in 1999 CIN1 -- normal since the         8/10/2021    12:00 AM 8/29/2019    12:00 AM   PAP / HPV   PAP (Historical)  negative for intraepithelial lesion       PAP-ABSTRACT See Scanned Document            This result is from an external source.     ASCVD Risk   The 10-year ASCVD risk score (Raphael HERNANDEZ, et al.,  "2019) is: 0.7%    Values used to calculate the score:      Age: 54 years      Sex: Female      Is Non- : No      Diabetic: No      Tobacco smoker: No      Systolic Blood Pressure: 104 mmHg      Is BP treated: No      HDL Cholesterol: 90 mg/dL      Total Cholesterol: 230 mg/dL    Reviewed and updated as needed this visit by Provider       Med Hx  Surg Hx  Fam Hx          Hasn't had her period in 3 months now. Sees obstetrics & gynecology.        Objective    Exam  /74   Pulse 75   Temp 97.3  F (36.3  C)   Ht 1.518 m (4' 11.76\")   Wt 54.9 kg (121 lb 0.6 oz)   SpO2 100%   BMI 23.83 kg/m     Estimated body mass index is 23.83 kg/m  as calculated from the following:    Height as of this encounter: 1.518 m (4' 11.76\").    Weight as of this encounter: 54.9 kg (121 lb 0.6 oz).    Physical Exam  GENERAL: alert and no distress  EYES: Eyes grossly normal to inspection, PERRL and conjunctivae and sclerae normal  HENT: ear canals and TM's normal, nose and mouth without ulcers or lesions  NECK: no adenopathy, no asymmetry, masses, or scars  RESP: lungs clear to auscultation - no rales, rhonchi or wheezes  BREAST: normal without masses, tenderness or nipple discharge and no palpable axillary masses or adenopathy  CV: regular rate and rhythm, normal S1 S2, no S3 or S4, no murmur, click or rub, no peripheral edema  ABDOMEN: soft, nontender, without hepatosplenomegaly or masses  MS: no gross musculoskeletal defects noted, no edema  SKIN: no suspicious lesions or rashes  NEURO: Normal strength and tone, mentation intact and speech normal  PSYCH: mentation appears normal, affect normal/bright      Signed Electronically by: Cathy Hollingsworth MD  "

## 2024-05-02 NOTE — PATIENT INSTRUCTIONS
Increase vitamin D from 5000 units weekly to 7000 units weekly   Preventive Care Advice   This is general advice given by our system to help you stay healthy. However, your care team may have specific advice just for you. Please talk to your care team about your preventive care needs.  Nutrition  Eat 5 or more servings of fruits and vegetables each day.  Try wheat bread, brown rice and whole grain pasta (instead of white bread, rice, and pasta).  Get enough calcium and vitamin D. Check the label on foods and aim for 100% of the RDA (recommended daily allowance).  Lifestyle  Exercise at least 150 minutes each week   (30 minutes a day, 5 days a week).  Do muscle strengthening activities 2 days a week. These help control your weight and prevent disease.  No smoking.  Wear sunscreen to prevent skin cancer.  Have a dental exam and cleaning every 6 months.  Yearly exams  See your health care team every year to talk about:  Any changes in your health.  Any medicines your care team has prescribed.  Preventive care, family planning, and ways to prevent chronic diseases.  Shots (vaccines)   HPV shots (up to age 26), if you've never had them before.  Hepatitis B shots (up to age 59), if you've never had them before.  COVID-19 shot: Get this shot when it's due.  Flu shot: Get a flu shot every year.  Tetanus shot: Get a tetanus shot every 10 years.  Pneumococcal, hepatitis A, and RSV shots: Ask your care team if you need these based on your risk.  Shingles shot (for age 50 and up).  General health tests  Diabetes screening:  Starting at age 35, Get screened for diabetes at least every 3 years.  If you are younger than age 35, ask your care team if you should be screened for diabetes.  Cholesterol test: At age 39, start having a cholesterol test every 5 years, or more often if advised.  Bone density scan (DEXA): At age 50, ask your care team if you should have this scan for osteoporosis (brittle bones).  Hepatitis C: Get tested  at least once in your life.  STIs (sexually transmitted infections)  Before age 24: Ask your care team if you should be screened for STIs.  After age 24: Get screened for STIs if you're at risk. You are at risk for STIs (including HIV) if:  You are sexually active with more than one person.  You don't use condoms every time.  You or a partner was diagnosed with a sexually transmitted infection.  If you are at risk for HIV, ask about PrEP medicine to prevent HIV.  Get tested for HIV at least once in your life, whether you are at risk for HIV or not.  Cancer screening tests  Cervical cancer screening: If you have a cervix, begin getting regular cervical cancer screening tests at age 21. Most people who have regular screenings with normal results can stop after age 65. Talk about this with your provider.  Breast cancer scan (mammogram): If you've ever had breasts, begin having regular mammograms starting at age 40. This is a scan to check for breast cancer.  Colon cancer screening: It is important to start screening for colon cancer at age 45.  Have a colonoscopy test every 10 years (or more often if you're at risk) Or, ask your provider about stool tests like a FIT test every year or Cologuard test every 3 years.  To learn more about your testing options, visit: https://www.[employer]/105941.pdf.  For help making a decision, visit: https://bit.ly/sv53145.  Prostate cancer screening test: If you have a prostate and are age 55 to 69, ask your provider if you would benefit from a yearly prostate cancer screening test.  Lung cancer screening: If you are a current or former smoker age 50 to 80, ask your care team if ongoing lung cancer screenings are right for you.  For informational purposes only. Not to replace the advice of your health care provider. Copyright   2023 [employer]. All rights reserved. Clinically reviewed by the [facility] Transitions Program. [employer] [id number] - REV  01/24.    Learning About Stress  What is stress?     Stress is your body's response to a hard situation. Your body can have a physical, emotional, or mental response. Stress is a fact of life for most people, and it affects everyone differently. What causes stress for you may not be stressful for someone else.  A lot of things can cause stress. You may feel stress when you go on a job interview, take a test, or run a race. This kind of short-term stress is normal and even useful. It can help you if you need to work hard or react quickly. For example, stress can help you finish an important job on time.  Long-term stress is caused by ongoing stressful situations or events. Examples of long-term stress include long-term health problems, ongoing problems at work, or conflicts in your family. Long-term stress can harm your health.  How does stress affect your health?  When you are stressed, your body responds as though you are in danger. It makes hormones that speed up your heart, make you breathe faster, and give you a burst of energy. This is called the fight-or-flight stress response. If the stress is over quickly, your body goes back to normal and no harm is done.  But if stress happens too often or lasts too long, it can have bad effects. Long-term stress can make you more likely to get sick, and it can make symptoms of some diseases worse. If you tense up when you are stressed, you may develop neck, shoulder, or low back pain. Stress is linked to high blood pressure and heart disease.  Stress also harms your emotional health. It can make you giraldo, tense, or depressed. Your relationships may suffer, and you may not do well at work or school.  What can you do to manage stress?  You can try these things to help manage stress:   Do something active. Exercise or activity can help reduce stress. Walking is a great way to get started. Even everyday activities such as housecleaning or yard work can help.  Try yoga or osito  chi. These techniques combine exercise and meditation. You may need some training at first to learn them.  Do something you enjoy. For example, listen to music or go to a movie. Practice your hobby or do volunteer work.  Meditate. This can help you relax, because you are not worrying about what happened before or what may happen in the future.  Do guided imagery. Imagine yourself in any setting that helps you feel calm. You can use online videos, books, or a teacher to guide you.  Do breathing exercises. For example:  From a standing position, bend forward from the waist with your knees slightly bent. Let your arms dangle close to the floor.  Breathe in slowly and deeply as you return to a standing position. Roll up slowly and lift your head last.  Hold your breath for just a few seconds in the standing position.  Breathe out slowly and bend forward from the waist.  Let your feelings out. Talk, laugh, cry, and express anger when you need to. Talking with supportive friends or family, a counselor, or a vida leader about your feelings is a healthy way to relieve stress. Avoid discussing your feelings with people who make you feel worse.  Write. It may help to write about things that are bothering you. This helps you find out how much stress you feel and what is causing it. When you know this, you can find better ways to cope.  What can you do to prevent stress?  You might try some of these things to help prevent stress:  Manage your time. This helps you find time to do the things you want and need to do.  Get enough sleep. Your body recovers from the stresses of the day while you are sleeping.  Get support. Your family, friends, and community can make a difference in how you experience stress.  Limit your news feed. Avoid or limit time on social media or news that may make you feel stressed.  Do something active. Exercise or activity can help reduce stress. Walking is a great way to get started.  Where can you learn  "more?  Go to https://www.[employer].net/patiented  Enter N032 in the search box to learn more about \"Learning About Stress.\"  Current as of: October 24, 2023               Content Version: 14.0    5844-6289 [employer].   Care instructions adapted under license by your healthcare professional. If you have questions about a medical condition or this instruction, always ask your healthcare professional. [employer] disclaims any warranty or liability for your use of this information.      "

## 2024-06-28 DIAGNOSIS — F90.0 ATTENTION DEFICIT HYPERACTIVITY DISORDER (ADHD), PREDOMINANTLY INATTENTIVE TYPE: ICD-10-CM

## 2024-06-28 RX ORDER — LISDEXAMFETAMINE DIMESYLATE 30 MG/1
30 CAPSULE ORAL EVERY MORNING
Qty: 90 CAPSULE | Refills: 0 | Status: SHIPPED | OUTPATIENT
Start: 2024-06-28 | End: 2024-09-24

## 2024-06-28 NOTE — TELEPHONE ENCOUNTER
Prescription refilled and sent to pharmacy.    PDMP Review         Value Time User    State PDMP site checked  Yes 6/28/2024 12:16 PM Cathy Hollingsworth MD

## 2024-06-28 NOTE — TELEPHONE ENCOUNTER
Medication requested: VYVANSE 30 MG capsule   Last office visit: 5/2/24  [facility] appointments: none  Medication last refilled: 3/20/24; 90 + 0 refills, last sold 3/30/24 per   Last qualifying labs: N/A    Routing refill request to provider for review/approval because:  Drug not on the [facility] refill protocol     Lion GUERRERO, RN  06/28/24 12:09 PM

## 2024-09-24 DIAGNOSIS — F90.0 ATTENTION DEFICIT HYPERACTIVITY DISORDER (ADHD), PREDOMINANTLY INATTENTIVE TYPE: ICD-10-CM

## 2024-09-25 NOTE — TELEPHONE ENCOUNTER
Vyvanse 30 mg    Last Office Visit: 5/2/24  Future [facility] Appointments: None  Medication last refilled: 6/28/24 #90 with 0 refill(s)     verified - last fill date: 6/29/24 #90    CSA on File - Yes, 3/10/23    Routing refill request to provider for review/approval because:  Drug not on the [facility] refill protocol     JYOTI MoellerN, RN, CCM

## 2024-09-26 RX ORDER — LISDEXAMFETAMINE DIMESYLATE 30 MG/1
30 CAPSULE ORAL EVERY MORNING
Qty: 90 CAPSULE | Refills: 0 | Status: SHIPPED | OUTPATIENT
Start: 2024-09-26

## 2024-12-13 DIAGNOSIS — F90.0 ATTENTION DEFICIT HYPERACTIVITY DISORDER (ADHD), PREDOMINANTLY INATTENTIVE TYPE: Primary | ICD-10-CM

## 2024-12-13 NOTE — TELEPHONE ENCOUNTER
Pt provided new insurance and said that her prescriptions now need to go to [facility] mail order.     She will be leaving the country on 12/30 for 3 months. Her script will be able to be refilled on 12/26.    She needs script for generic lisdexamfetamine dimesylate ONLY. No brand name.    If we have any complications she would like a call ASAP.      [place]

## 2024-12-16 RX ORDER — LISDEXAMFETAMINE DIMESYLATE 30 MG/1
30 CAPSULE ORAL EVERY MORNING
Qty: 90 CAPSULE | Refills: 0 | Status: SHIPPED | OUTPATIENT
Start: 2024-12-16

## 2024-12-16 NOTE — TELEPHONE ENCOUNTER
Medication requested: lisdexamfetamine dimesylate 30 MG  Last office visit: 5/2/2024  [facility] appointments: 4/30/2025  Medication last refilled: 9/26/2024; #90 + 0 refills; last sold #90 on 9/30/2024 per   Last qualifying labs: n/a    Routing refill request to provider for review/approval because:  Drug not on the [facility] refill protocol     Rx not due to be filled until 12/29/2024, however, pt is leaving country for 3 months on 12/26/2024 and wants Rx sent to   [facility].    CESAR Ace, RN  12/16/24, 9:21 AM

## 2025-03-25 DIAGNOSIS — F90.0 ATTENTION DEFICIT HYPERACTIVITY DISORDER (ADHD), PREDOMINANTLY INATTENTIVE TYPE: ICD-10-CM

## 2025-03-25 NOTE — TELEPHONE ENCOUNTER
Lisdexamfetamine (Vyvanse) 30 mg    Last Office Visit: 5/2/24  [facility] Appointments: 4/17/25  Medication last refilled: 12/16/24 #90 with 0 refill(s)     verified - last fill date: 12/20/24 #90    CSA on File - 3/2/23    Routing refill request to provider for review/approval because:  Drug not on the [facility] refill protocol     JYOTI MoellerN, RN, CCM

## 2025-03-26 RX ORDER — LISDEXAMFETAMINE DIMESYLATE 30 MG/1
30 CAPSULE ORAL EVERY MORNING
Qty: 90 CAPSULE | Refills: 0 | Status: SHIPPED | OUTPATIENT
Start: 2025-03-26

## 2025-04-17 ENCOUNTER — OFFICE VISIT (OUTPATIENT)
Dept: FAMILY MEDICINE | Facility: CLINIC | Age: 55
End: 2025-04-17
Payer: COMMERCIAL

## 2025-04-17 VITALS
TEMPERATURE: 97 F | SYSTOLIC BLOOD PRESSURE: 108 MMHG | WEIGHT: 113 LBS | BODY MASS INDEX: 22.78 KG/M2 | DIASTOLIC BLOOD PRESSURE: 76 MMHG | HEART RATE: 79 BPM | HEIGHT: 59 IN | OXYGEN SATURATION: 99 %

## 2025-04-17 DIAGNOSIS — Z13.1 SCREENING FOR DIABETES MELLITUS: ICD-10-CM

## 2025-04-17 DIAGNOSIS — E55.9 VITAMIN D DEFICIENCY: ICD-10-CM

## 2025-04-17 DIAGNOSIS — Z13.220 SCREENING FOR CHOLESTEROL LEVEL: ICD-10-CM

## 2025-04-17 DIAGNOSIS — Z00.00 ROUTINE GENERAL MEDICAL EXAMINATION AT A HEALTH CARE FACILITY: Primary | ICD-10-CM

## 2025-04-17 DIAGNOSIS — F90.0 ATTENTION DEFICIT HYPERACTIVITY DISORDER (ADHD), PREDOMINANTLY INATTENTIVE TYPE: ICD-10-CM

## 2025-04-17 RX ORDER — ESTRADIOL 0.1 MG/G
CREAM VAGINAL
COMMUNITY

## 2025-04-17 SDOH — HEALTH STABILITY: PHYSICAL HEALTH: ON AVERAGE, HOW MANY DAYS PER WEEK DO YOU ENGAGE IN MODERATE TO STRENUOUS EXERCISE (LIKE A BRISK WALK)?: 6 DAYS

## 2025-04-17 ASSESSMENT — SOCIAL DETERMINANTS OF HEALTH (SDOH): HOW OFTEN DO YOU GET TOGETHER WITH FRIENDS OR RELATIVES?: TWICE A WEEK

## 2025-04-17 NOTE — PATIENT INSTRUCTIONS
Patient Education   Preventive Care Advice   This is general advice given by our system to help you stay healthy. However, your care team may have specific advice just for you. Please talk to your care team about your preventive care needs.  Nutrition  Eat 5 or more servings of fruits and vegetables each day.  Try wheat bread, brown rice and whole grain pasta (instead of white bread, rice, and pasta).  Get enough calcium and vitamin D. Check the label on foods and aim for 100% of the RDA (recommended daily allowance).  Lifestyle  Exercise at least 150 minutes each week  (30 minutes a day, 5 days a week).  Do muscle strengthening activities 2 days a week. These help control your weight and prevent disease.  No smoking.  Wear sunscreen to prevent skin cancer.  Have a dental exam and cleaning every 6 months.  Yearly exams  See your health care team every year to talk about:  Any changes in your health.  Any medicines your care team has prescribed.  Preventive care, family planning, and ways to prevent chronic diseases.  Shots (vaccines)   HPV shots (up to age 26), if you've never had them before.  Hepatitis B shots (up to age 59), if you've never had them before.  COVID-19 shot: Get this shot when it's due.  Flu shot: Get a flu shot every year.  Tetanus shot: Get a tetanus shot every 10 years.  Pneumococcal, hepatitis A, and RSV shots: Ask your care team if you need these based on your risk.  Shingles shot (for age 50 and up)  General health tests  Diabetes screening:  Starting at age 35, Get screened for diabetes at least every 3 years.  If you are younger than age 35, ask your care team if you should be screened for diabetes.  Cholesterol test: At age 39, start having a cholesterol test every 5 years, or more often if advised.  Bone density scan (DEXA): At age 50, ask your care team if you should have this scan for osteoporosis (brittle bones).  Hepatitis C: Get tested at least once in your life.  STIs (sexually  transmitted infections)  Before age 24: Ask your care team if you should be screened for STIs.  After age 24: Get screened for STIs if you're at risk. You are at risk for STIs (including HIV) if:  You are sexually active with more than one person.  You don't use condoms every time.  You or a partner was diagnosed with a sexually transmitted infection.  If you are at risk for HIV, ask about PrEP medicine to prevent HIV.  Get tested for HIV at least once in your life, whether you are at risk for HIV or not.  Cancer screening tests  Cervical cancer screening: If you have a cervix, begin getting regular cervical cancer screening tests starting at age 21.  Breast cancer scan (mammogram): If you've ever had breasts, begin having regular mammograms starting at age 40. This is a scan to check for breast cancer.  Colon cancer screening: It is important to start screening for colon cancer at age 45.  Have a colonoscopy test every 10 years (or more often if you're at risk) Or, ask your provider about stool tests like a FIT test every year or Cologuard test every 3 years.  To learn more about your testing options, visit:   .  For help making a decision, visit:   https://bit.ly/ie43936.  Prostate cancer screening test: If you have a prostate, ask your care team if a prostate cancer screening test (PSA) at age 55 is right for you.  Lung cancer screening: If you are a current or former smoker ages 50 to 80, ask your care team if ongoing lung cancer screenings are right for you.  For informational purposes only. Not to replace the advice of your health care provider. Copyright   2023 [employer]. All rights reserved. Clinically reviewed by the [facility] Transitions Program. [employer] [id number] - REV 01/24.

## 2025-04-17 NOTE — PROGRESS NOTES
Preventive Care Visit  [facility]  Cathy Hollingsworth MD, Family Medicine  Apr 17, 2025      Assessment & Plan     Routine general medical examination at a health care facility  Screening for cholesterol level  Screening for diabetes mellitus  She will return for fasting labs.  - Lipid panel reflex to direct LDL Fasting; Future  - Glucose; Future    Attention deficit hyperactivity disorder (ADHD), predominantly inattentive type  The current medical regimen is effective;  continue present plan and medication - Vyvanse 30 mg daily.    Vitamin D deficiency  Takes vitamin D 5000 units weekly.   - Vitamin D deficiency screening; Future      Counseling  Appropriate preventive services were addressed with this patient via screening, questionnaire, or discussion as appropriate for fall prevention, nutrition, physical activity, Tobacco-use cessation, social engagement, weight loss and cognition.  Checklist reviewing preventive services available has been given to the patient.  Reviewed patient's diet, addressing concerns and/or questions.   She is at risk for psychosocial distress and has been provided with information to reduce risk.       Follow-up  Return in about 53 weeks (around 4/23/2026) for Annual Wellness Visit.    Oneil Maher is a 55 year old, presenting for the following:  Physical           HPI       New job -- She was in [place] for 3 months for work. 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           4/17/2025   General Health   How would you rate your overall physical health? Good   Feel stress (tense, anxious, or unable to sleep) To some extent   (!) STRESS CONCERN      4/17/2025   Nutrition   Three or more servings of calcium each day? (!) I DON'T KNOW   Diet: I don't know   How many servings of fruit and vegetables per day? (!) 0-1   How many sweetened beverages each day? 0-1         4/17/2025   Exercise   Days per week of moderate/strenous exercise 6 days          4/17/2025   Social Factors   Frequency of gathering with friends or relatives Twice a week   Worry food won't last until get money to buy more No   Food not last or not have enough money for food? No   Do you have housing? (Housing is defined as stable permanent housing and does not include staying ouside in a car, in a tent, in an abandoned building, in an overnight shelter, or couch-surfing.) Yes   Are you worried about losing your housing? No   Lack of transportation? No   Unable to get utilities (heat,electricity)? No         4/17/2025   Fall Risk   Fallen 2 or more times in the past year? No   Trouble with walking or balance? No          4/17/2025   Dental   Dentist two times every year? Yes           Today's PHQ-2 Score:       4/17/2025    10:29 AM   PHQ-2 ( 1999 [employer])   Q1: Little interest or pleasure in doing things 0   Q2: Feeling down, depressed or hopeless 0   PHQ-2 Score 0    Q1: Little interest or pleasure in doing things Not at all   Q2: Feeling down, depressed or hopeless Not at all   PHQ-2 Score 0       Patient-reported         4/17/2025   Substance Use   Alcohol more than 3/day or more than 7/wk No   Do you use any other substances recreationally? No     Social History     Tobacco Use    Smoking status: Former     Current packs/day: 1.00     Average packs/day: 1 pack/day for 2.0 years (2.0 ttl pk-yrs)     Types: Cigarettes    Smokeless tobacco: Never   Vaping Use    Vaping status: Never Used   Substance Use Topics    Alcohol use: Yes     Comment: 1 x per week    Drug use: No          Mammogram Screening - Mammogram every 1-2 years updated in Health Maintenance based on mutual decision making          4/17/2025   One time HIV Screening   Previous HIV test? No         4/17/2025   STI Screening   New sexual partner(s) since last STI/HIV test? No           8/10/2021    12:00 AM 8/29/2019    12:00 AM   PAP / HPV   PAP (Historical)  negative for intraepithelial lesion       PAP-ABSTRACT See Scanned  "Document            This result is from an external source.     ASCVD Risk   The 10-year ASCVD risk score (Raphael HERNANDEZ, et al., 2019) is: 0.8%    Values used to calculate the score:      Age: 55 years      Sex: Female      Is Non- : No      Diabetic: No      Tobacco smoker: No      Systolic Blood Pressure: 108 mmHg      Is BP treated: No      HDL Cholesterol: 90 mg/dL      Total Cholesterol: 230 mg/dL       Reviewed and updated as needed this visit by Provider     Meds  Problems  Med Hx  Surg Hx  Fam Hx               Objective    Exam  /76   Pulse 79   Temp 97  F (36.1  C)   Ht 1.51 m (4' 11.45\")   Wt 51.3 kg (113 lb)   SpO2 99%   BMI 22.48 kg/m         Physical Exam  GENERAL: alert and no distress  EYES: Eyes grossly normal to inspection, PERRL and conjunctivae and sclerae normal  HENT: ear canals and TM's normal, nose and mouth without ulcers or lesions  NECK: no adenopathy, no asymmetry, masses, or scars  RESP: lungs clear to auscultation - no rales, rhonchi or wheezes  BREAST: normal without masses, tenderness or nipple discharge and no palpable axillary masses or adenopathy  CV: regular rate and rhythm, normal S1 S2, no S3 or S4, no murmur, click or rub, no peripheral edema  ABDOMEN: soft, nontender, without hepatosplenomegaly or masses  MS: no gross musculoskeletal defects noted, no edema  SKIN: no suspicious lesions or rashes  NEURO: Normal strength and tone, mentation intact and speech normal  PSYCH: mentation appears normal, affect normal/bright      Signed Electronically by: Cathy Hollingsworth MD    "

## 2025-07-07 DIAGNOSIS — F90.0 ATTENTION DEFICIT HYPERACTIVITY DISORDER (ADHD), PREDOMINANTLY INATTENTIVE TYPE: ICD-10-CM

## 2025-07-07 NOTE — TELEPHONE ENCOUNTER
Lisdexamfetamine (Vyvanse) 30 mg    Last Office Visit: 4/17/25  [facility] [place]: None  Medication last refilled: 3/26/25 #90 with 0 refill(s)     verified - last fill date: 3/29/25 #90    CSA on File - 3/2/23  Last [facility] - 3/2/22    Routing refill request to provider for review/approval because:  Drug not on the [facility] refill protocol     JYOTI MoellerN, RN, CCM

## 2025-07-08 RX ORDER — LISDEXAMFETAMINE DIMESYLATE 30 MG/1
30 CAPSULE ORAL EVERY MORNING
Qty: 90 CAPSULE | Refills: 0 | Status: SHIPPED | OUTPATIENT
Start: 2025-07-08